# Patient Record
Sex: FEMALE | Race: BLACK OR AFRICAN AMERICAN | Employment: UNEMPLOYED | URBAN - METROPOLITAN AREA
[De-identification: names, ages, dates, MRNs, and addresses within clinical notes are randomized per-mention and may not be internally consistent; named-entity substitution may affect disease eponyms.]

---

## 2022-05-16 ENCOUNTER — OFFICE VISIT (OUTPATIENT)
Dept: URGENT CARE | Facility: URGENT CARE | Age: 37
End: 2022-05-16
Payer: MEDICAID

## 2022-05-16 VITALS
WEIGHT: 183.2 LBS | TEMPERATURE: 99 F | DIASTOLIC BLOOD PRESSURE: 90 MMHG | OXYGEN SATURATION: 100 % | HEART RATE: 103 BPM | SYSTOLIC BLOOD PRESSURE: 144 MMHG

## 2022-05-16 DIAGNOSIS — H57.89 IRRITATION OF LEFT EYE: Primary | ICD-10-CM

## 2022-05-16 PROCEDURE — 99203 OFFICE O/P NEW LOW 30 MIN: CPT | Performed by: PHYSICIAN ASSISTANT

## 2022-05-16 RX ORDER — TOBRAMYCIN AND DEXAMETHASONE 3; 1 MG/ML; MG/ML
1-2 SUSPENSION/ DROPS OPHTHALMIC EVERY 4 HOURS
Qty: 4.2 ML | Refills: 0 | Status: SHIPPED | OUTPATIENT
Start: 2022-05-16 | End: 2022-05-23

## 2022-05-16 ASSESSMENT — ENCOUNTER SYMPTOMS
PHOTOPHOBIA: 1
EYE REDNESS: 1
EYE DISCHARGE: 1
EYE PAIN: 1
EYE ITCHING: 1
FEVER: 0

## 2022-05-16 NOTE — LETTER
[facility] URGENT CARE [address] [address]  Phone: [phone]    May 16, 2022        Herberdee ANDRADE Puja  [address] [facility] [address]          To whom it may concern:    RE: Homero Luo    Patient was seen and treated today at our clinic and missed work.  Patient may return to work on 5/17/22.      Please contact me for questions or concerns.      Sincerely,        Kerline Pradhan

## 2022-05-16 NOTE — PROGRESS NOTES
SUBJECTIVE:   Homero Luo is a 36 year old female presenting with a chief complaint of   Chief Complaint   Patient presents with     Eye Infection Left Eye     Possible eye infection left eye started 1-2 days ago, pain, swollen, red, itchy, burn, very sensitive to light, pt can barely see, worsening pt said it cleared up yesterday but when she woke up it was swollen and sensitive to light       She is a new patient of [facility].  Patient states recently had glued on eye lashes and following had symptoms mentioned above.  Clear and yellow discharge.  Itches, burns and hurts.  Contacts.  Patient rubbing eyes.   She states sun hurts her eyes.      Treatment:  otc moistener gtts.  Aloe vera gel.        Review of Systems   Constitutional: Negative for fever.   Eyes: Positive for photophobia, pain, discharge, redness and itching. Negative for visual disturbance.   All other systems reviewed and are negative.      History reviewed. No pertinent past medical history.  History reviewed. No pertinent family history.  Current Outpatient Medications   Medication Sig Dispense Refill     tobramycin-dexamethasone (TOBRADEX) 0.3-0.1 % ophthalmic suspension Place 1-2 drops Into the left eye every 4 hours for 7 days 4.2 mL 0     Social History     Tobacco Use     Smoking status: Not on file     Smokeless tobacco: Not on file   Substance Use Topics     Alcohol use: Not on file       OBJECTIVE  BP (!) 144/90   Pulse 103   Temp 99  F (37.2  C) (Axillary)   Wt 83.1 kg (183 lb 3.2 oz)   SpO2 100%     Physical Exam  Vitals and nursing note reviewed.   Constitutional:       General: She is not in acute distress.     Appearance: Normal appearance. She is normal weight.   Eyes:      Extraocular Movements: Extraocular movements intact.      Conjunctiva/sclera: Conjunctivae normal.      Pupils: Pupils are equal, round, and reactive to light.      Comments: Patient had immediate relief of pain with numbing drops.  No FB noted.  Patient  has no eye lashes - states she removed all with the irritation of her eye.    No fluorescein uptake at all.  Mild edema to upper and lower lids.     Cardiovascular:      Rate and Rhythm: Normal rate.   Skin:     General: Skin is warm and dry.   Neurological:      Mental Status: She is alert.   Psychiatric:         Mood and Affect: Mood normal.         Labs:  No results found for this or any previous visit (from the past 24 hour(s)).    X-Ray was not done.    ASSESSMENT:      ICD-10-CM    1. Irritation of left eye  H57.89 tobramycin-dexamethasone (TOBRADEX) 0.3-0.1 % ophthalmic suspension     Adult Eye Referral        Medical Decision Making:    Differential Diagnosis:  Eye Problem: Bacterial conjunctivitis  Viral conjunctivitis  Allergic conjunctivitis  Stye (external)  Stye (internal)  Corneal abrasion  Foreign body    Serious Comorbid Conditions:  Adult:  reviewed    PLAN:      rx for tobradex.  If no improvement in 2 days, ophthalmology referral.  Note for work.  Patient left with eyes open and clearly no photophobia.      Followup:    If not improving or if condition worsens, follow up with your Primary Care Provider, If not improving or if conditions worsens over the next 12-24 hours, go to the Emergency Department    There are no Patient Instructions on file for this visit.

## 2022-07-07 ENCOUNTER — OFFICE VISIT (OUTPATIENT)
Dept: OPTOMETRY | Facility: CLINIC | Age: 37
End: 2022-07-07
Payer: MEDICAID

## 2022-07-07 DIAGNOSIS — H52.223 REGULAR ASTIGMATISM OF BOTH EYES: ICD-10-CM

## 2022-07-07 DIAGNOSIS — H52.13 MYOPIA OF BOTH EYES: Primary | ICD-10-CM

## 2022-07-07 PROCEDURE — 92310 CONTACT LENS FITTING OU: CPT | Mod: GA | Performed by: OPTOMETRIST

## 2022-07-07 PROCEDURE — 92015 DETERMINE REFRACTIVE STATE: CPT | Performed by: OPTOMETRIST

## 2022-07-07 PROCEDURE — 92004 COMPRE OPH EXAM NEW PT 1/>: CPT | Performed by: OPTOMETRIST

## 2022-07-07 ASSESSMENT — VISUAL ACUITY
OS_PH_SC: 20/60
VA_OR_OS_CURRENT_RX: 20/20
METHOD: SNELLEN - LINEAR
OS_SC: 20/400
VA_OR_OD_CURRENT_RX: 20/20
OS_SC: 20/60
CORRECTION_TYPE: CONTACTS
OD_CC: 20/20
OD_CC: 20/20
OS_PH_SC+: -1
OD_CC+: -1

## 2022-07-07 ASSESSMENT — REFRACTION_MANIFEST
OS_CYLINDER: +1.00
OS_SPHERE: -4.75
OD_CYLINDER: +1.25
METHOD_AUTOREFRACTION: 1
OD_SPHERE: -4.00
OS_AXIS: 075
OD_AXIS: 107

## 2022-07-07 ASSESSMENT — REFRACTION_CURRENTRX
OD_SPHERE: -3.25
OS_BRAND: ALCON AIR OPTIX PLUS HYDRAGLYDE BC 8.6, D 14.2
OD_BRAND: ALCON AIR OPTIX PLUS HYDRAGLYDE BC 8.6, D 14.2
OS_SPHERE: -4.00

## 2022-07-07 ASSESSMENT — CUP TO DISC RATIO
OS_RATIO: 0.2
OD_RATIO: 0.2

## 2022-07-07 ASSESSMENT — CONF VISUAL FIELD
OS_NORMAL: 1
OD_NORMAL: 1

## 2022-07-07 ASSESSMENT — TONOMETRY
OS_IOP_MMHG: 17
OD_IOP_MMHG: 16
IOP_METHOD: APPLANATION

## 2022-07-07 ASSESSMENT — EXTERNAL EXAM - RIGHT EYE: OD_EXAM: NORMAL

## 2022-07-07 ASSESSMENT — SLIT LAMP EXAM - LIDS
COMMENTS: NORMAL
COMMENTS: NORMAL

## 2022-07-07 ASSESSMENT — EXTERNAL EXAM - LEFT EYE: OS_EXAM: NORMAL

## 2022-07-07 NOTE — PROGRESS NOTES
Chief Complaint   Patient presents with     Annual Eye Exam     Accompanied by kids  Previous contact lens wearer? Yes: Alma Strickland  Comfort of contact lenses :good  Satisfied with current lenses: Yes        Last Eye Exam: 2 years ago   Dilated Previously: Yes, side effects of dilation explained today    What are you currently using to see?  glasses and contacts    Distance Vision Acuity: Noticed gradual change in both eyes    Near Vision Acuity: Not satisfied     Eye Comfort: teary  Do you use eye drops? : Yes: AT  Occupation or Hobbies:        Marya Lee - Optometric Assistant     Medical, surgical and family histories reviewed and updated 7/7/2022.       OBJECTIVE: See Ophthalmology exam    ASSESSMENT:    ICD-10-CM    1. Myopia of both eyes - Both Eyes  H52.13 EYE EXAM (SIMPLE-NONBILLABLE)     REFRACTION     CONTACT LENS FITTING,BILAT w/ signed waiver   2. Regular astigmatism of both eyes - Both Eyes  H52.223       PLAN:     Patient Instructions   Myopia is a result of long eyes. It is commonly referred to as near-sightedness. Seeing clearly in the distance is the main challenge.    Eyeglass prescription given.    The affects of the dilating drops last for 4- 6 hours.  You will be more sensitive to light and vision will be blurry up close.  Do not drive if you do not feel comfortable.  Mydriatic sunglasses were given if needed.    Recommend annual eye exams.    Landy Dupont O.D.  [address] [address]    [phone]

## 2022-07-07 NOTE — LETTER
7/7/2022         RE: Homero Landry  [address] [facility] [address]        Dear Colleague,    Thank you for referring your patient, Homero Landry, to the [facility]. Please see a copy of my visit note below.    Chief Complaint   Patient presents with     Annual Eye Exam     Accompanied by kids  Previous contact lens wearer? Yes: Acuvtiff Strickland  Comfort of contact lenses :good  Satisfied with current lenses: Yes        Last Eye Exam: 2 years ago   Dilated Previously: Yes, side effects of dilation explained today    What are you currently using to see?  glasses and contacts    Distance Vision Acuity: Noticed gradual change in both eyes    Near Vision Acuity: Not satisfied     Eye Comfort: teary  Do you use eye drops? : Yes: AT  Occupation or Hobbies:        Marya Lee - Optometric Assistant     Medical, surgical and family histories reviewed and updated 7/7/2022.       OBJECTIVE: See Ophthalmology exam    ASSESSMENT:    ICD-10-CM    1. Myopia of both eyes - Both Eyes  H52.13 EYE EXAM (SIMPLE-NONBILLABLE)     REFRACTION     CONTACT LENS FITTING,BILAT w/ signed waiver   2. Regular astigmatism of both eyes - Both Eyes  H52.223       PLAN:     Patient Instructions   Myopia is a result of long eyes. It is commonly referred to as near-sightedness. Seeing clearly in the distance is the main challenge.    Eyeglass prescription given.    The affects of the dilating drops last for 4- 6 hours.  You will be more sensitive to light and vision will be blurry up close.  Do not drive if you do not feel comfortable.  Mydriatic sunglasses were given if needed.    Recommend annual eye exams.    Landy Dupont O.D.  [facility]   [address] [place] [address]    [phone]                             Again, thank you for allowing me to participate in the care of your patient.        Sincerely,        Landy Dupont OD

## 2022-07-07 NOTE — PATIENT INSTRUCTIONS
Myopia is a result of long eyes. It is commonly referred to as near-sightedness. Seeing clearly in the distance is the main challenge.    Eyeglass prescription given.    The affects of the dilating drops last for 4- 6 hours.  You will be more sensitive to light and vision will be blurry up close.  Do not drive if you do not feel comfortable.  Mydriatic sunglasses were given if needed.    Recommend annual eye exams.    Landy Dupont O.D.  [address] [address] [phone]

## 2022-09-05 ENCOUNTER — NURSE TRIAGE (OUTPATIENT)
Dept: NURSING | Facility: CLINIC | Age: 37
End: 2022-09-05

## 2022-09-05 NOTE — TELEPHONE ENCOUNTER
S: Eye infection    B: Had an eye infection back in May and did not complete the full course of antibiotic eyedrops. States she has the same infection again and is wondering how she can get more antibiotic eyedrops. States she has moderate discomfort when she looks at light, denies fever, denies eye discharge, denies eye injury.    A: probable eye infection    R: Advised patient that she would need to be seen to get another prescription for the eyedrops. Patient advised ok and hung up before any care advice could be given.     RICHIE STEPHENS RN      Reason for Disposition    Looking at light causes MODERATE to SEVERE eye pain (i.e., photophobia)    Additional Information    Negative: Followed an eye injury    Negative: Eye pain from chemical in the eye    Negative: Eye pain from foreign body in eye    Negative: Has sinus pain or pressure    Negative: SEVERE eye pain    Negative: Complete loss of vision in one or both eyes    Negative: Eyelids are very swollen (shut or almost) and fever    Negative: Eyelid (outer) is very red and fever    Negative: Foreign body sensation ('feels like something is in there') and irrigation didn't help    Negative: Blurred vision and new or worsening    Negative: Recent eye surgery and increasing eye pain    Negative: Ulcer or sore seen on the cornea (clear center part of the eye)    Negative: Vomiting    Negative: Patient sounds very sick or weak to the triager    Negative: MODERATE eye pain or discomfort (e.g., interferes with normal activities or awakens from sleep; more than mild)    Protocols used: EYE PAIN AND OTHER SYMPTOMS-A-OH

## 2022-09-06 ENCOUNTER — OFFICE VISIT (OUTPATIENT)
Dept: URGENT CARE | Facility: URGENT CARE | Age: 37
End: 2022-09-06
Payer: COMMERCIAL

## 2022-09-06 VITALS
SYSTOLIC BLOOD PRESSURE: 161 MMHG | WEIGHT: 184.6 LBS | DIASTOLIC BLOOD PRESSURE: 102 MMHG | TEMPERATURE: 98.3 F | BODY MASS INDEX: 25.84 KG/M2 | HEIGHT: 71 IN | HEART RATE: 108 BPM | OXYGEN SATURATION: 100 %

## 2022-09-06 DIAGNOSIS — H57.11 ACUTE RIGHT EYE PAIN: Primary | ICD-10-CM

## 2022-09-06 DIAGNOSIS — H57.89 REDNESS OF RIGHT EYE: ICD-10-CM

## 2022-09-06 DIAGNOSIS — I16.0 HYPERTENSIVE URGENCY: ICD-10-CM

## 2022-09-06 PROCEDURE — 99214 OFFICE O/P EST MOD 30 MIN: CPT | Performed by: PHYSICIAN ASSISTANT

## 2022-09-06 RX ORDER — OFLOXACIN 3 MG/ML
1-2 SOLUTION/ DROPS OPHTHALMIC 4 TIMES DAILY
Qty: 4 ML | Refills: 0 | Status: SHIPPED | OUTPATIENT
Start: 2022-09-06 | End: 2022-09-14

## 2022-09-06 NOTE — PROGRESS NOTES
Chief Complaint   Patient presents with     Eye Pain Right Eye           ASSESSMENT:     ICD-10-CM    1. Acute right eye pain  H57.11 ofloxacin (OCUFLOX) 0.3 % ophthalmic solution     Adult Eye  Referral   2. Redness of right eye  H57.89    3. Hypertensive urgency  I16.0            PLAN: Painful red right eye.  No obvious uptake with fluorescein dye but eye is constantly watering and washes away the dye.  Unable to do appropriate funduscopic exam due to eye watering and photophobia.  Patient wants TobraDex which she had in May.  Told her I do not feel comfortable prescribing eyedrop with steroid in it when I do know the cause of the eye pain and redness.  In addition we do not have slit-lamp examination machine to rule out more serious things.  She asked to see a different provider.  coworker, Alfonso Hurtado PA-C,  Also talked to the patient.  We do not understand why she is insistent on that same prescription.  I am more than willing to prescribe a different antibiotic drop but nothing with steroid in it.  Steroid eyedrop should be prescribed by optometrist or ophthalmologist, not family practice.  In addition with the elevated blood pressure I cannot rule out some sort of stroke in her eye.  Recommend the ER.  If she is not willing to go to the ER she should at least follow-up with optometry within the next couple of days.  Patient left frustrated.  I did prescribe ofloxacin  Antibiotic ointment.    I have discussed the possibility of  worsening symptoms and indication to RTC or go to the ER if they occur.  All questions are answered, patient indicates understanding of these issues and is in agreement with plan.   Patient care instructions are discussed/given at the end of visit.   Lots of rest and fluids.      Lora Sauceda PA-C      SUBJECTIVE:  36-year-old female presents for right eye pain since yesterday.  Headache behind right eye.  Thinks her niece may have hit her eye with her finger last  "night.  She states the eye was irritated prior to this but became worse after it.  Bright lights bother it.  Had eye doctor examination in July 2020 with normal eye pressures.  Does wear contacts.  Was seen in May in urgent care for left eye irritation and was given TobraDex antibiotic/steroid eyedrop.  She states she only took 2 days of it and did not finish it and feels this is the reason she still has the eye irritation.  No exudate.  Contacted nurse triage line and was told if she wanted a prescription for this she would need to be seen.  Denies decreased vision.    No Known Allergies    No past medical history on file.    No current outpatient medications on file prior to visit.  No current facility-administered medications on file prior to visit.      Social History     Tobacco Use     Smoking status: Current Every Day Smoker     Smokeless tobacco: Never Used   Substance Use Topics     Alcohol use: Not on file       ROS:  CONSTITUTIONAL: Negative for fatigue or fever.  EYES: Negative for eye problems.  ENT: As above.  RESP: As above.  CV: Negative for chest pains.  GI: Negative for vomiting.  MUSCULOSKELETAL:  Negative for significant muscle or joint pains.  NEUROLOGIC: Negative for headaches.  SKIN: Negative for rash.  PSYCH: Normal mentation for age.    OBJECTIVE:  BP (!) 161/102 (BP Location: Left arm, Patient Position: Sitting, Cuff Size: Adult Regular)   Pulse 108   Temp 98.3  F (36.8  C) (Tympanic)   Ht 1.803 m (5' 11\")   Wt 83.7 kg (184 lb 9.6 oz)   SpO2 100%   BMI 25.75 kg/m    GENERAL APPEARANCE: Sitting in exam room with light off    EYES:Conjunctiva/sclera clear with moderate injection on the right.  Pupils equal reactive to light and accommodation.  Photophobia right eye.  Eye watering.  Unable to do funduscopic exam due to the eye irritation.  Proparacaine and fluorescein dye placed.  No uptake.  No obvious foreign body noted.    Lora Sauceda PA-C    "

## 2022-09-07 ENCOUNTER — OFFICE VISIT (OUTPATIENT)
Dept: OPTOMETRY | Facility: CLINIC | Age: 37
End: 2022-09-07
Attending: PHYSICIAN ASSISTANT
Payer: COMMERCIAL

## 2022-09-07 DIAGNOSIS — H57.11 ACUTE RIGHT EYE PAIN: ICD-10-CM

## 2022-09-07 DIAGNOSIS — H16.9 KERATITIS: Primary | ICD-10-CM

## 2022-09-07 PROCEDURE — 99213 OFFICE O/P EST LOW 20 MIN: CPT | Performed by: OPTOMETRIST

## 2022-09-07 RX ORDER — NEOMYCIN SULFATE, POLYMYXIN B SULFATE AND DEXAMETHASONE 3.5; 10000; 1 MG/ML; [USP'U]/ML; MG/ML
1-2 SUSPENSION/ DROPS OPHTHALMIC EVERY 4 HOURS
Qty: 5 ML | Refills: 1 | Status: SHIPPED | OUTPATIENT
Start: 2022-09-07 | End: 2024-07-25

## 2022-09-07 ASSESSMENT — VISUAL ACUITY
CORRECTION_TYPE: GLASSES
OD_CC: 20/25
METHOD: SNELLEN - LINEAR
OS_CC: 20/25
OD_CC+: -1

## 2022-09-07 ASSESSMENT — TONOMETRY
OS_IOP_MMHG: 14
IOP_METHOD: APPLANATION
OD_IOP_MMHG: 14

## 2022-09-07 ASSESSMENT — SLIT LAMP EXAM - LIDS
COMMENTS: NORMAL
COMMENTS: NORMAL

## 2022-09-07 ASSESSMENT — EXTERNAL EXAM - LEFT EYE: OS_EXAM: NORMAL

## 2022-09-07 ASSESSMENT — CONF VISUAL FIELD
OS_NORMAL: 1
OD_NORMAL: 1

## 2022-09-07 ASSESSMENT — EXTERNAL EXAM - RIGHT EYE: OD_EXAM: NORMAL

## 2022-09-07 NOTE — LETTER
9/7/2022         RE: Homero Landry  [address] [facility] [place] [address]        Dear Colleague,    Thank you for referring your patient, Homero Landry, to the [facility]. Please see a copy of my visit note below.    Chief Complaint   Patient presents with     Eye Pain     Right eye, playing with niece Monday, photophobia, sharp pain like a needle is poking, pain with eye movement, foreign body sensation. Blood pressure was very high yesterday on 9/6/22 and was told to be checked to make sure she wasn't having an eye stroke.                Marya Lee - Optometric Assistant     See Review Of Systems       Medical, surgical and family histories reviewed and updated 9/7/2022.         OBJECTIVE: See Ophthalmology exam    ASSESSMENT:    ICD-10-CM    1. Keratitis  H16.9 neomycin-polymyxin-dexamethasone (MAXITROL) 3.5-37508-4.1 SUSP ophthalmic susp   2. Acute right eye pain  H57.11 Adult Eye  Referral     neomycin-polymyxin-dexamethasone (MAXITROL) 3.5-45499-1.1 SUSP ophthalmic susp      PLAN:    Patient Instructions   MAXITROL IS TO BE INSTILLED IN THE RIGHT EYE EVERY 4 HOURS WHILE AWAKE  TO TREAT AND MANAGE KERATITIS IN THE RIGHT EYE.    NO CONTACT LENS WEAR IS ADVISED.      RTC 1-2 WEEKS FOR A FOLLOWUP VISIT SO THE EYE PRESSURE CAN BE MONITORED.    KERATITIS IS NOT CONTAGIOUS          The affects of the dilating drops last for 4- 6 hours.  You will be more sensitive to light and vision will be blurry up close.  Do not drive if you do not feel comfortable.  Mydriatic sunglasses were given if needed.    PT WAS AT CLINIC UNTIL NOON ON SEPT 7, 2022.      Landy Dupont O.D.  [facility]   [address] [place] [address]    [phone]           Again, thank you for allowing me to participate in the care of your patient.        Sincerely,        Landy Dupont OD

## 2022-09-07 NOTE — PROGRESS NOTES
Chief Complaint   Patient presents with     Eye Pain     Right eye, playing with niece Monday, photophobia, sharp pain like a needle is poking, pain with eye movement, foreign body sensation. Blood pressure was very high yesterday on 9/6/22 and was told to be checked to make sure she wasn't having an eye stroke.                Marya Lee - Optometric Assistant     See Review Of Systems       Medical, surgical and family histories reviewed and updated 9/7/2022.         OBJECTIVE: See Ophthalmology exam    ASSESSMENT:    ICD-10-CM    1. Keratitis  H16.9 neomycin-polymyxin-dexamethasone (MAXITROL) 3.5-61317-0.1 SUSP ophthalmic susp   2. Acute right eye pain  H57.11 Adult Eye  Referral     neomycin-polymyxin-dexamethasone (MAXITROL) 3.5-12073-5.1 SUSP ophthalmic susp      PLAN:    Patient Instructions   MAXITROL IS TO BE INSTILLED IN THE RIGHT EYE EVERY 4 HOURS WHILE AWAKE  TO TREAT AND MANAGE KERATITIS IN THE RIGHT EYE.    NO CONTACT LENS WEAR IS ADVISED.      RTC 1-2 WEEKS FOR A FOLLOWUP VISIT SO THE EYE PRESSURE CAN BE MONITORED.    KERATITIS IS NOT CONTAGIOUS          The affects of the dilating drops last for 4- 6 hours.  You will be more sensitive to light and vision will be blurry up close.  Do not drive if you do not feel comfortable.  Mydriatic sunglasses were given if needed.    PT WAS AT CLINIC UNTIL NOON ON SEPT 7, 2022.      Landy Dupont O.D.  [facility]   [address] [place] [address]    [phone]

## 2022-09-07 NOTE — PATIENT INSTRUCTIONS
MAXITROL IS TO BE INSTILLED IN THE RIGHT EYE EVERY 4 HOURS WHILE AWAKE  TO TREAT AND MANAGE KERATITIS IN THE RIGHT EYE.    NO CONTACT LENS WEAR IS ADVISED.      RTC 1-2 WEEKS FOR A FOLLOWUP VISIT SO THE EYE PRESSURE CAN BE MONITORED.    KERATITIS IS NOT CONTAGIOUS          The affects of the dilating drops last for 4- 6 hours.  You will be more sensitive to light and vision will be blurry up close.  Do not drive if you do not feel comfortable.  Mydriatic sunglasses were given if needed.    PT WAS AT CLINIC UNTIL NOON ON SEPT 7, 2022.      Landy Dupont O.D.  [address] [address]    [phone]

## 2023-08-07 ENCOUNTER — TELEPHONE (OUTPATIENT)
Dept: OPTOMETRY | Facility: CLINIC | Age: 38
End: 2023-08-07
Payer: COMMERCIAL

## 2023-08-07 NOTE — TELEPHONE ENCOUNTER
[facility] Call Center    Phone Message    May a detailed message be left on voicemail: yes     Reason for Call: Other: Patient called requesting a call back from clinical staff. She states she has some questions. She would not go into detail. Please call to advise.      Action Taken: Other: eye    Travel Screening: Not Applicable

## 2023-08-08 ENCOUNTER — OFFICE VISIT (OUTPATIENT)
Dept: FAMILY MEDICINE | Facility: CLINIC | Age: 38
End: 2023-08-08
Payer: COMMERCIAL

## 2023-08-08 VITALS
DIASTOLIC BLOOD PRESSURE: 117 MMHG | BODY MASS INDEX: 25.06 KG/M2 | SYSTOLIC BLOOD PRESSURE: 155 MMHG | TEMPERATURE: 98.3 F | HEIGHT: 71 IN | WEIGHT: 179 LBS | OXYGEN SATURATION: 100 % | HEART RATE: 77 BPM | RESPIRATION RATE: 16 BRPM

## 2023-08-08 DIAGNOSIS — Z11.59 NEED FOR HEPATITIS C SCREENING TEST: ICD-10-CM

## 2023-08-08 DIAGNOSIS — R03.0 ELEVATED BP WITHOUT DIAGNOSIS OF HYPERTENSION: ICD-10-CM

## 2023-08-08 DIAGNOSIS — Z28.21 IMMUNIZATION DECLINED: ICD-10-CM

## 2023-08-08 DIAGNOSIS — D64.9 ANEMIA, UNSPECIFIED TYPE: Primary | ICD-10-CM

## 2023-08-08 DIAGNOSIS — Z11.4 SCREENING FOR HIV (HUMAN IMMUNODEFICIENCY VIRUS): ICD-10-CM

## 2023-08-08 DIAGNOSIS — Z12.4 CERVICAL CANCER SCREENING: ICD-10-CM

## 2023-08-08 LAB
ERYTHROCYTE [DISTWIDTH] IN BLOOD BY AUTOMATED COUNT: 27.7 % (ref 10–15)
HCT VFR BLD AUTO: 32.1 % (ref 35–47)
HGB BLD-MCNC: 8.7 G/DL (ref 11.7–15.7)
MCH RBC QN AUTO: 18 PG (ref 26.5–33)
MCHC RBC AUTO-ENTMCNC: 27.1 G/DL (ref 31.5–36.5)
MCV RBC AUTO: 66 FL (ref 78–100)
PLATELET # BLD AUTO: 296 10E3/UL (ref 150–450)
RBC # BLD AUTO: 4.84 10E6/UL (ref 3.8–5.2)
WBC # BLD AUTO: 6.2 10E3/UL (ref 4–11)

## 2023-08-08 PROCEDURE — 86803 HEPATITIS C AB TEST: CPT | Performed by: FAMILY MEDICINE

## 2023-08-08 PROCEDURE — 36415 COLL VENOUS BLD VENIPUNCTURE: CPT | Performed by: FAMILY MEDICINE

## 2023-08-08 PROCEDURE — 85027 COMPLETE CBC AUTOMATED: CPT | Performed by: FAMILY MEDICINE

## 2023-08-08 PROCEDURE — 99213 OFFICE O/P EST LOW 20 MIN: CPT | Performed by: FAMILY MEDICINE

## 2023-08-08 PROCEDURE — 87389 HIV-1 AG W/HIV-1&-2 AB AG IA: CPT | Performed by: FAMILY MEDICINE

## 2023-08-08 PROCEDURE — 82728 ASSAY OF FERRITIN: CPT | Performed by: FAMILY MEDICINE

## 2023-08-08 ASSESSMENT — PATIENT HEALTH QUESTIONNAIRE - PHQ9
10. IF YOU CHECKED OFF ANY PROBLEMS, HOW DIFFICULT HAVE THESE PROBLEMS MADE IT FOR YOU TO DO YOUR WORK, TAKE CARE OF THINGS AT HOME, OR GET ALONG WITH OTHER PEOPLE: VERY DIFFICULT
SUM OF ALL RESPONSES TO PHQ QUESTIONS 1-9: 12
SUM OF ALL RESPONSES TO PHQ QUESTIONS 1-9: 12

## 2023-08-08 ASSESSMENT — PAIN SCALES - GENERAL: PAINLEVEL: NO PAIN (0)

## 2023-08-08 NOTE — TELEPHONE ENCOUNTER
Called patient. She mentioned she didn't need anything to do with eyes and that she just left her doctor.

## 2023-08-08 NOTE — PROGRESS NOTES
Assessment & Plan     Anemia, unspecified type  -Homero was seen by me for the first time.  -She presented to the clinic wanting hemoglobin checked.  -She went to ED 10 days back for abdominal pain.  Hemoglobin was found to be low at 5.7.  CT scan was unremarkable.  Her abdominal pain has improved but she wants to recheck her hemoglobin.  Received 2 units of blood transfusion in ED. This was the first time she got blood transfusion.  -She has difficulty tolerating iron supplements due to nausea and vomiting, she does have few pills at home.  -Previously was Jenovah witness, Homero has not had blood transfusions in the past.  Homero reported that she has had hemoglobin levels between 5-6 in the past which improved without any intervention.  No history of iron transfusions.    Elevated BP without diagnosis of hypertension  -Blood pressure is elevated at 155/117.  -Currently not on any medications.  -Homero reported that her home blood pressures are normal but she couldn't remember the range.  And added that she does not prefer being on any medication.     Cervical cancer screening  -Homero reported that she had Pap in 2020 at [place] which was normal.  -Recommended to get the results and let us know to update in the chart.    Screening for HIV (human immunodeficiency virus)  -Routine screening.  - HIV Antigen Antibody Combo; Future    Need for hepatitis C screening test  -Routine screening.  - Hepatitis C Screen Reflex to HCV RNA Quant and Genotype; Future    Immunization declined  -Homero declined all immunizations.          Nicotine/Tobacco Cessation:  She reports that she has been smoking. She has never used smokeless tobacco.        Depression Screening Follow Up        8/8/2023    10:50 AM   PHQ   PHQ-9 Total Score 12   Q9: Thoughts of better off dead/self-harm past 2 weeks Not at all         Follow Up Actions Taken           Hemant Alejo MD  [facility]  "JOSHUA Valentin is a 37 year old, presenting for the following health issues:  No chief complaint on file.        8/8/2023    10:58 AM   Additional Questions   Roomed by Paco   Accompanied by Self       History of Present Illness       Reason for visit:  Followup for bloof transfusion    She eats 0-1 servings of fruits and vegetables daily.She consumes 2 sweetened beverage(s) daily.She exercises with enough effort to increase her heart rate 60 or more minutes per day.         ED/ Followup:    Facility:  [facility]  Date of visit: 7/29/23  Reason for visit: Abd pain & Anemia  Current Status: feels better no abd pain      ?done at [facility] in 2020  Review of Systems   Constitutional, HEENT, cardiovascular, pulmonary, gi and gu systems are negative, except as otherwise noted.      Objective    BP (!) 155/117   Pulse 77   Temp 98.3  F (36.8  C) (Oral)   Resp 16   Ht 1.803 m (5' 11\")   Wt 81.2 kg (179 lb)   LMP 07/31/2023   SpO2 100%   BMI 24.97 kg/m    Body mass index is 24.97 kg/m .  Physical Exam   GENERAL: healthy, alert and no distress  NECK: no adenopathy, no asymmetry, masses, or scars and thyroid normal to palpation  RESP: lungs clear to auscultation - no rales, rhonchi or wheezes  CV: regular rate and rhythm, normal S1 S2, no S3 or S4, no murmur, click or rub, no peripheral edema and peripheral pulses strong  ABDOMEN: soft, nontender, no hepatosplenomegaly, no masses and bowel sounds normal  MS: no gross musculoskeletal defects noted, no edema                      "

## 2023-08-09 DIAGNOSIS — D50.9 IRON DEFICIENCY ANEMIA, UNSPECIFIED IRON DEFICIENCY ANEMIA TYPE: Primary | ICD-10-CM

## 2023-08-09 LAB
FERRITIN SERPL-MCNC: 7 NG/ML (ref 6–175)
HCV AB SERPL QL IA: NONREACTIVE
HIV 1+2 AB+HIV1 P24 AG SERPL QL IA: NONREACTIVE

## 2023-08-13 ENCOUNTER — HEALTH MAINTENANCE LETTER (OUTPATIENT)
Age: 38
End: 2023-08-13

## 2023-10-24 ENCOUNTER — TELEPHONE (OUTPATIENT)
Dept: FAMILY MEDICINE | Facility: CLINIC | Age: 38
End: 2023-10-24
Payer: COMMERCIAL

## 2023-10-24 NOTE — COMMUNITY RESOURCES LIST (ENGLISH)
10/24/2023   [facility]  N/A  For questions about this resource list or additional care needs, please contact your primary care clinic or care manager.  Phone: [phone]   Email: N/A   Address: [facility] [address] [place] [address]   Hours: N/A        Financial Stability       Rent and mortgage payment assistance  1  [facility] Distance: 4.33 miles      In-Person, Phone/Virtual   [address] [place] [address]  Language: English  Hours: Mon - Fri 8:00 AM - 4:30 PM  Fees: Free   Phone: (384) 354-4879 Email: servicecenterinfo@[facility] Website: https://www.SunsetSunible/[facility]/facilities/service-center-info     2  [facility] ([facility] Distance: 4.38 miles      In-Person   [address] [place] [address]  Language: English  Hours: Mon - Fri 8:00 AM - 4:00 PM  Fees: Free   Phone: (855) 739-1155 Email: info@[facility].[employer] Website: https://[facility]EastOn The Net Yet/          Food and Nutrition       Food pantry  3  [facility] - Food pantry Distance: 0.81 miles      Pickup   [address] [place] [address]  Language: English  Hours: Fri 9:30 AM - 2:00 PM  Fees: Free   Phone: (862) 660-8676 Email: contact@[employer]apcoder.org Website: https://Zapcoder.org/     4  [facility] Food Shelf Distance: 1.31 miles      Pickup   [address] [place] [address]  Language: English  Hours: Thu 2:00 PM - 6:00 PM , Thu 3:00 PM - 6:00 PM  Fees: Free   Phone: (793) 558-6388 Email: foodshelf@[facility].[employer] Website: https://Dinner Lab.org/serve/food-shelf/     SNAP application assistance  5  [employer] [facility] [employer] [place] ([employer]) Distance: 3.67 miles      In-Person, Phone/Virtual   [address] [place] [address]  Language: English, Hmong  Hours: Mon - Fri  8:30 AM - 4:30 PM  Fees: Free   Phone: (433) 141-6970 Ext.1 Email: info@[facility].org Website: https://www.Context app/     6  [facility] Distance: 4.33 miles      Phone/Virtual   [address] [place] [address]  Language: English, German  Hours: Mon 9:00 AM - 1:00 PM , Tue - Thu 9:00 AM - 4:00 PM , Fri 9:00 AM - 1:00 PM  Fees: Free   Phone: (190) 269-8105 Email: info@[employer] Website: http://www.mana.bo.org/     Soup kitchen or free meals  7  [facility] [place] [place] [place] Distance: 1.02 miles      [facility]   [address] [place] [address]  Language: English  Hours: Wed 5:30 PM - 6:30 PM  Fees: Free   Phone: (558) 229-8106 Email: rafat@[employer] Website: https://www.Truzip/     8  [facility] - [place] [place] Distance: 2.98 miles      In-Person   [address] [place] [address]  Language: English  Hours: Mon - Fri 12:30 PM - 1:30 PM  Fees: Free   Phone: (603) 387-8949 Email: manju@Mytrus Website: https://Mytrus/          Important Numbers & Websites       Emergency Services   911  City Services   311  Poison Control   (989) 637-2827  Suicide Prevention Lifeline   (415) 739-9557 (TALK)  Child Abuse Hotline   (744) 324-7685 (4-A-Child)  Sexual Assault Hotline   (696) 381-3704 (HOPE)  National Runaway Safeline   (949) 675-6610 (RUNAWAY)  All-Options Talkline   (903) 260-6163  Substance Abuse Referral   (535) 834-8943 (HELP)

## 2023-10-24 NOTE — TELEPHONE ENCOUNTER
Pt calling to request a prescription for prenatals and nausea medication.    Assisted pt in scheduling an appointment to get prescription.      Arti San RN  [facility]

## 2023-10-25 ENCOUNTER — VIRTUAL VISIT (OUTPATIENT)
Dept: FAMILY MEDICINE | Facility: CLINIC | Age: 38
End: 2023-10-25
Payer: COMMERCIAL

## 2023-10-25 DIAGNOSIS — Z32.01 PREGNANCY TEST POSITIVE: Primary | ICD-10-CM

## 2023-10-25 PROCEDURE — 99442 PR PHYSICIAN TELEPHONE EVALUATION 11-20 MIN: CPT | Mod: 93 | Performed by: PHYSICIAN ASSISTANT

## 2023-10-25 RX ORDER — ONDANSETRON 4 MG/1
4 TABLET, ORALLY DISINTEGRATING ORAL EVERY 8 HOURS PRN
Qty: 21 TABLET | Refills: 0 | Status: SHIPPED | OUTPATIENT
Start: 2023-10-25 | End: 2024-07-25

## 2023-10-25 RX ORDER — PRENATAL VIT/IRON FUM/FOLIC AC 27MG-0.8MG
1 TABLET ORAL DAILY
Qty: 90 TABLET | Refills: 3 | Status: SHIPPED | OUTPATIENT
Start: 2023-10-25 | End: 2024-07-25

## 2023-10-25 ASSESSMENT — ENCOUNTER SYMPTOMS: NAUSEA: 1

## 2023-10-25 NOTE — PROGRESS NOTES
Homero is a 38 year old who is being evaluated via a billable telephone visit.      What phone number would you like to be contacted at? [phone]  How would you like to obtain your AVS? Mail a copy    Distant Location (provider location):  On-site    Assessment & Plan   Problem List Items Addressed This Visit    None  Visit Diagnoses       Pregnancy test positive    -  Primary    Relevant Medications    Prenatal Vit-Fe Fumarate-FA (PRENATAL MULTIVITAMIN W/IRON) 27-0.8 MG tablet    ondansetron (ZOFRAN ODT) 4 MG ODT tab    Other Relevant Orders    Ob/Gyn  Referral    US OB < 14 Weeks Single         Pt is about 6-8 weeks along  Prenatal 1 tab daily  Zofran 4 mg every 8-12 hours as needed     Schedule appointment with OBGYN as soon as possible   Schedule US, if unable to get records from [facility]    16 minutes spent by me on the date of the encounter doing chart review, history and exam, documentation and further activities per the note           Perla Sanchez PA-C  [facility]    Fabiana Valentin is a 38 year old, presenting for the following health issues:  Nausea        10/25/2023     6:55 AM   Additional Questions   Roomed by Tapan       History of Present Illness       Reason for visit:  Prenantal pills and nausea meds  Symptom onset:  1-2 weeks ago  Symptoms include:  Nausea, and previous abdominal pain  Symptom intensity:  Mild  Symptom progression:  Staying the same  Had these symptoms before:  No  What makes it worse:  None  What makes it better:  Sleeping    She eats 2-3 servings of fruits and vegetables daily.She consumes 2 sweetened beverage(s) daily.She exercises with enough effort to increase her heart rate 20 to 29 minutes per day.  She exercises with enough effort to increase her heart rate 4 days per week.   She is taking medications regularly.     LMP about 2 month ago. Pt was seen at [facility] clinic in [place], she had confirmatory  pregnancy test and US. She does not have US results and does not remember information about gestational age or when she is due.     Pt is requesting prenatal vitamins and nausea medication prescriptions today        Review of Systems   Gastrointestinal:  Positive for nausea.      Constitutional, HEENT, cardiovascular, pulmonary, gi and gu systems are negative, except as otherwise noted.      Objective           Vitals:  No vitals were obtained today due to virtual visit.    Physical Exam   healthy, alert, and no distress  PSYCH: Alert and oriented times 3; coherent speech, normal   rate and volume, able to articulate logical thoughts, able   to abstract reason, no tangential thoughts, no hallucinations   or delusions  Her affect is normal  RESP: No cough, no audible wheezing, able to talk in full sentences  Remainder of exam unable to be completed due to telephone visits                Phone call duration: 15 minutes

## 2023-10-26 ENCOUNTER — TELEPHONE (OUTPATIENT)
Dept: OBGYN | Facility: CLINIC | Age: 38
End: 2023-10-26
Payer: COMMERCIAL

## 2023-10-26 NOTE — TELEPHONE ENCOUNTER
Called to offer earlier appointment tomorrow in [place] with Dr. Jeffers. Appointment will not work with her schedule. She is added to waitlist and should be notified if an earlier appointment becomes available and will keep already scheduled appointment for 11/14/23.    [employer] message sent also to schedule Prenatal Nurse Intake appointment prior to this appointment.    Kelli Mishra, [facility]

## 2023-10-26 NOTE — TELEPHONE ENCOUNTER
[facility]    Phone Message    May a detailed message be left on voicemail: yes     Reason for Call: Other: Patient called to schedule appointment for OB. Patient is approx 10w along per LMP being 8/17/23. Writer could not find any sooner openings then 11/14 which she would be 13 weeks along. Please call the patient back to reschedule if needing to be seen sooner. Thank you.      Action Taken: Other: OBGYN    Travel Screening: Not Applicable

## 2023-10-30 ENCOUNTER — TELEPHONE (OUTPATIENT)
Dept: OPTOMETRY | Facility: CLINIC | Age: 38
End: 2023-10-30
Payer: COMMERCIAL

## 2023-10-30 NOTE — TELEPHONE ENCOUNTER
[facility]    Phone Message    May a detailed message be left on voicemail: yes     Reason for Call: Other: Pt is requesting a call from  regarding a lens appointment and as well as reordering her glasses. Please have  reach out to pt. Thank You!     Action Taken: Message routed to:  [facility] eye    Travel Screening: Not Applicable

## 2023-11-28 ENCOUNTER — VIRTUAL VISIT (OUTPATIENT)
Dept: FAMILY MEDICINE | Facility: CLINIC | Age: 38
End: 2023-11-28
Payer: COMMERCIAL

## 2023-11-28 DIAGNOSIS — K04.7 DENTAL INFECTION: Primary | ICD-10-CM

## 2023-11-28 PROCEDURE — 99213 OFFICE O/P EST LOW 20 MIN: CPT | Mod: VID | Performed by: FAMILY MEDICINE

## 2023-11-28 RX ORDER — AMOXICILLIN 500 MG/1
500 CAPSULE ORAL 2 TIMES DAILY
Qty: 10 CAPSULE | Refills: 0 | Status: SHIPPED | OUTPATIENT
Start: 2023-11-28 | End: 2024-07-25

## 2023-11-28 NOTE — PROGRESS NOTES
Homero is a 38 year old who is being evaluated via a billable video visit.      How would you like to obtain your AVS? MyChart  If the video visit is dropped, the invitation should be resent by: Text to cell phone: [phone]  Will anyone else be joining your video visit? No          Assessment & Plan     Dental infection  Agree with her decision to pursue dental care.  Concerned with some swelling being felt into her nasal area.  We should treat with antibiotics.  If she does not have a good improvemen, should be seen for care in emergency room.  Monitor symptoms and if worsening get help sooner.  Discussed risks associated with antibiotics.  Patient expresses understanding.      Magdiel Cazares MD  [facility]   Homero is a 38 year old, presenting for the following health issues:  Medication Request (Antibiotics for tooth infections )        11/28/2023    11:40 AM   Additional Questions   Roomed by hser   Accompanied by self       History of Present Illness       Reason for visit:  Infected tooth  Symptoms include:  Facr pain  Symptom intensity:  Severe  Symptom progression:  Worsening  Had these symptoms before:  Yes  Has tried/received treatment for these symptoms:  Yes  Previous treatment was successful:  Yes  Prior treatment description:  Antibiotics    She eats 4 or more servings of fruits and vegetables daily.She consumes 3 sweetened beverage(s) daily.She exercises with enough effort to increase her heart rate 10 to 19 minutes per day.  She exercises with enough effort to increase her heart rate 3 or less days per week.   She is taking medications regularly.     Patient with concerns of tooth pain for 4 to 5 days.  Has swelling in the gum around tooth.  Feels some pressure into her nasal cavity.  No fever.  No purulent fluid.  Try to get dental care but she cannot get an appointment with anyone that accepts her insurance until mid December.  Cannot afford care  otherwise.    She relates that she is not pregnant.  She has no allergies.          Objective    Vitals - Patient Reported  Systolic (Patient Reported):  (unable to)  Diastolic (Patient Reported):  (unable to)  Weight (Patient Reported):  (unable to)  Height (Patient Reported):  (unable to)  SpO2 (Patient Reported):  (unable to)  Temperature (Patient Reported):  (unable to)  Pulse (Patient Reported):  (unable to)  Pain Score: No Pain (0)        Physical Exam   GENERAL: Healthy, alert and no distress  EYES: Eyes grossly normal to inspection.  No discharge or erythema, or obvious scleral/conjunctival abnormalities.  RESP: No audible wheeze, cough, or visible cyanosis.  No visible retractions or increased work of breathing.    SKIN: Visible skin clear. No significant rash, abnormal pigmentation or lesions.  NEURO: Cranial nerves grossly intact.  Mentation and speech appropriate for age.  PSYCH: Mentation appears normal, affect normal/bright, judgement and insight intact, normal speech and appearance well-groomed.        Video-Visit Details    Type of service:  Video Visit   Video Start Time: 1240  Video End Time:1247    Originating Location (pt. Location): Home    Distant Location (provider location):  On-site  Platform used for Video Visit: [employer]  Prior to immunization administration, verified patients identity using patient s name and date of birth. Please see Immunization Activity for additional information.     Screening Questionnaire for Adult Immunization    Are you sick today?   No   Do you have allergies to medications, food, a vaccine component or latex?   No   Have you ever had a serious reaction after receiving a vaccination?   No   Do you have a long-term health problem with heart, lung, kidney, or metabolic disease (e.g., diabetes), asthma, a blood disorder, no spleen, complement component deficiency, a cochlear implant, or a spinal fluid leak?  Are you on long-term aspirin therapy?   No   Do you have  cancer, leukemia, HIV/AIDS, or any other immune system problem?   No   Do you have a parent, brother, or sister with an immune system problem?   No   In the past 3 months, have you taken medications that affect  your immune system, such as prednisone, other steroids, or anticancer drugs; drugs for the treatment of rheumatoid arthritis, Crohn s disease, or psoriasis; or have you had radiation treatments?   No   Have you had a seizure, or a brain or other nervous system problem?   No   During the past year, have you received a transfusion of blood or blood    products, or been given immune (gamma) globulin or antiviral drug?   No   For women: Are you pregnant or is there a chance you could become       pregnant during the next month?   No   Have you received any vaccinations in the past 4 weeks?   No     Immunization questionnaire answers were all negative.      Patient instructed to remain in clinic for 15 minutes afterwards, and to report any adverse reactions.     Screening performed by Alessio Oakley MA on 11/28/2023 at 11:43 AM.

## 2023-11-28 NOTE — COMMUNITY RESOURCES LIST (ENGLISH)
11/28/2023   [facility] [employer]  N/A  For questions about this resource list or additional care needs, please contact your primary care clinic or care manager.  Phone: [phone]   Email: N/A   Address: [address] [place] [address]   Hours: N/A        Financial Stability       Rent and mortgage payment assistance  1  [facility] Distance: 4.33 miles      In-Person, Phone/Virtual   [address] [place] [address]  Language: English  Hours: Mon - Fri 8:00 AM - 4:30 PM  Fees: Free   Phone: (696) 880-7086 Email: servicecenterinfo@[employer]. Website: https://www.[employer]linGuardian Analytics/[facility]/facilities/service-center-info     2  [facility] Distance: 4.38 miles      In-Person   [address] [place] [address]  Language: English  Hours: Mon - Fri 8:00 AM - 4:00 PM  Fees: Free   Phone: (506) 402-2635 Email: info@[facility].[employer] Website: https://[facility].org/          Important Numbers & Websites       Emergency Services   911  [facility] Services   311  Poison Control   (815) 309-6129  Suicide Prevention Lifeline   (604) 624-1202 (TALK)  Child Abuse Hotline   (296) 101-7835 (4-A-Child)  Sexual Assault Hotline   (663) 767-1524 (HOPE)  National Runaway Safeline   (542) 399-3719 (RUNAWAY)  All-Options Talkline   (464) 593-4847  Substance Abuse Referral   (776) 671-8829 (HELP)

## 2024-01-08 ENCOUNTER — MYC REFILL (OUTPATIENT)
Dept: FAMILY MEDICINE | Facility: CLINIC | Age: 39
End: 2024-01-08
Payer: COMMERCIAL

## 2024-01-08 DIAGNOSIS — Z32.01 PREGNANCY TEST POSITIVE: ICD-10-CM

## 2024-01-11 RX ORDER — PRENATAL VIT/IRON FUM/FOLIC AC 27MG-0.8MG
1 TABLET ORAL DAILY
Qty: 90 TABLET | Refills: 3 | OUTPATIENT
Start: 2024-01-11

## 2024-01-14 ENCOUNTER — E-VISIT (OUTPATIENT)
Dept: FAMILY MEDICINE | Facility: CLINIC | Age: 39
End: 2024-01-14
Payer: COMMERCIAL

## 2024-01-14 DIAGNOSIS — K08.89 PAIN, DENTAL: Primary | ICD-10-CM

## 2024-01-14 PROCEDURE — 99207 PR NON-BILLABLE SERV PER CHARTING: CPT | Performed by: FAMILY MEDICINE

## 2024-01-14 NOTE — PATIENT INSTRUCTIONS
Thank you for choosing us for your care. Based on the information provided, I believe you need to be seen immediately.  Please go urgent care or see a dentist as soon as possible.     You will not be charged for this eVisit.

## 2024-02-01 ENCOUNTER — APPOINTMENT (OUTPATIENT)
Dept: OPTOMETRY | Facility: CLINIC | Age: 39
End: 2024-02-01
Payer: COMMERCIAL

## 2024-02-01 PROCEDURE — V2020 VISION SVCS FRAMES PURCHASES: HCPCS | Performed by: OPTOMETRIST

## 2024-02-01 PROCEDURE — V2100 LENS SPHER SINGLE PLANO 4.00: HCPCS | Mod: RT | Performed by: OPTOMETRIST

## 2024-02-29 ENCOUNTER — APPOINTMENT (OUTPATIENT)
Dept: OPTOMETRY | Facility: CLINIC | Age: 39
End: 2024-02-29
Payer: COMMERCIAL

## 2024-02-29 PROCEDURE — 92340 FIT SPECTACLES MONOFOCAL: CPT | Performed by: OPTOMETRIST

## 2024-06-18 ENCOUNTER — E-VISIT (OUTPATIENT)
Dept: FAMILY MEDICINE | Facility: CLINIC | Age: 39
End: 2024-06-18
Payer: COMMERCIAL

## 2024-06-18 DIAGNOSIS — K04.7 INFECTED TOOTH: ICD-10-CM

## 2024-06-18 DIAGNOSIS — G44.209 ACUTE NON INTRACTABLE TENSION-TYPE HEADACHE: Primary | ICD-10-CM

## 2024-06-18 PROCEDURE — 99422 OL DIG E/M SVC 11-20 MIN: CPT | Performed by: PHYSICIAN ASSISTANT

## 2024-06-18 RX ORDER — NAPROXEN 500 MG/1
500 TABLET ORAL 2 TIMES DAILY WITH MEALS
Qty: 30 TABLET | Refills: 0 | Status: SHIPPED | OUTPATIENT
Start: 2024-06-18 | End: 2024-07-25

## 2024-06-18 RX ORDER — METHOCARBAMOL 500 MG/1
500 TABLET, FILM COATED ORAL 4 TIMES DAILY PRN
Qty: 30 TABLET | Refills: 0 | Status: SHIPPED | OUTPATIENT
Start: 2024-06-18 | End: 2024-07-25

## 2024-06-18 RX ORDER — AMOXICILLIN 500 MG/1
500 CAPSULE ORAL 3 TIMES DAILY
Qty: 30 CAPSULE | Refills: 0 | Status: SHIPPED | OUTPATIENT
Start: 2024-06-18 | End: 2024-06-28

## 2024-07-25 ENCOUNTER — VIRTUAL VISIT (OUTPATIENT)
Dept: FAMILY MEDICINE | Facility: CLINIC | Age: 39
End: 2024-07-25
Payer: COMMERCIAL

## 2024-07-25 DIAGNOSIS — B37.31 CANDIDIASIS OF VAGINA: Primary | ICD-10-CM

## 2024-07-25 PROCEDURE — 99213 OFFICE O/P EST LOW 20 MIN: CPT | Mod: 95 | Performed by: NURSE PRACTITIONER

## 2024-07-25 RX ORDER — FLUCONAZOLE 150 MG/1
150 TABLET ORAL ONCE
Qty: 1 TABLET | Refills: 0 | Status: SHIPPED | OUTPATIENT
Start: 2024-07-25 | End: 2024-07-25

## 2024-07-25 NOTE — PROGRESS NOTES
"Homero is a 38 year old who is being evaluated via a billable video visit.    How would you like to obtain your AVS? [facility]  If the video visit is dropped, the invitation should be resent by: Text to cell phone: [phone]  Will anyone else be joining your video visit? No      If patient has telephone visit, have they been educated on video visit as preferred visit method and offered to change to video visit? N/A    Instructions Relayed to Patient by Virtual Roomer:     Patient is active on Minubo:   Relayed following to patient: \"It looks like you are active on Minubo, are you able to join the visit this way? If not, do you need us to send you a link now or would you like your provider to send a link via text or email when they are ready to initiate the visit?\"      Patient Confirmed they will join visit via: [employer]  Reminded patient to ensure they were logged on to virtual visit by arrival time listed.   Asked if patient has flexibility to initiate visit sooner than arrival time: patient stated yes, documented in appointment notes availability to initiate visit earlier than arrival time     If pediatric virtual visit, ensured pediatric patient along with parent/guardian will be present for video visit.     Patient offered the website [email or web address] and/or phone number to [employer] Help line: [phone]     Assessment & Plan     Candidiasis of vagina  Complains of vaginal itching and \"cottage cheese thick discharge\". Denied recent abx use. No fever or chills, no pelvic pain. No foul smell.   Same current partner  Fluconazole 150 mg PO one time. Return if symptoms not improving.   - fluconazole (DIFLUCAN) 150 MG tablet; Take 1 tablet (150 mg) by mouth once for 1 dose    Nicotine/Tobacco Cessation  She reports that she has been smoking. She has never used smokeless tobacco.  Nicotine/Tobacco Cessation Plan  Information offered: Patient not interested at this time        Subjective "   Homero is a 38 year old, presenting for the following health issues:  No chief complaint on file.        7/25/2024     3:19 PM   Additional Questions   Roomed by kg   Accompanied by self       Video Start Time:  1710    Vaginal Problem        Vaginal Symptoms  possible yeast infection   Onset/Duration: yesterday  Description:  Vaginal Discharge: curd-like   Itching (Pruritis): YES  Burning sensation:  No  Odor: No  Accompanying Signs & Symptoms:  Urinary symptoms: No  Abdominal pain: No  Fever: No  History:   Sexually active: YES  New Partner: No  Possibility of Pregnancy:  No  Recent antibiotic use: No  Previous vaginitis issues: No  Precipitating or alleviating factors: None  Therapies tried and outcome: none            Objective         Vitals:  No vitals were obtained today due to virtual visit.    Physical Exam   GENERAL: alert and no distress  RESP: No audible wheeze, cough, or visible cyanosis.    SKIN: Visible skin clear. No significant rash, abnormal pigmentation or lesions.  NEURO: Cranial nerves grossly intact.  Mentation and speech appropriate for age.  PSYCH: Appropriate affect, tone, and pace of words          Video-Visit Details    Type of service:  Video Visit   Video End Time: 1718  Originating Location (pt. Location): Other      Distant Location (provider location):  On-site  Platform used for Video Visit: Sheron  Signed Electronically by: FRANCIE Barreto CNP

## 2024-08-07 ENCOUNTER — TELEPHONE (OUTPATIENT)
Dept: FAMILY MEDICINE | Facility: CLINIC | Age: 39
End: 2024-08-07
Payer: COMMERCIAL

## 2024-08-07 NOTE — TELEPHONE ENCOUNTER
Patient Quality Outreach    Patient is due for the following:   Cervical Cancer Screening - PAP Needed  Physical Preventive Adult Physical      Topic Date Due    Pneumococcal Vaccine (1 of 2 - PCV) Never done    Hepatitis B Vaccine (1 of 3 - 19+ 3-dose series) Never done    Diptheria Tetanus Pertussis (DTAP/TDAP/TD) Vaccine (1 - Tdap) Never done    COVID-19 Vaccine (1 - 2023-24 season) Never done       Next Steps:   Schedule a Adult Preventative    Type of outreach:    Sent letter.      Questions for provider review:    None           Breann Jackson MA

## 2024-10-03 ENCOUNTER — TELEPHONE (OUTPATIENT)
Dept: FAMILY MEDICINE | Facility: CLINIC | Age: 39
End: 2024-10-03
Payer: COMMERCIAL

## 2024-10-03 NOTE — TELEPHONE ENCOUNTER
"Attempted to call patient to discuss need for ED follow up d/t anemia (see below), but call went to  and  full, unable to leave message.    If patient calls back,    Please route to RN line, triage s/s if needed. ED provider mentioned Hgb 6.1 but patient declining going to another ED to get transfusion, does have hx anemia. Offer same day for ED follow up if patient OK with this, has seen [facility] in the past for anemia last year. Ensure patient knows primary care is unable to do transfusions in office, if provider is willing, would have to place orders for outpatient infusion (either iron or blood transfusion) and then schedule with them      Brigitte Paz, RN, BSN  [facility]    ___________________________________________    Called Dr. Dupont back to get a bit more information on message below, as with a Hgb 6.1, usually recommend transfusion.     Dr. Dupont mentioned at the [facility] ED they only have about 4 units of pRBCs available and these are only to be used for essentially mass transfusion emergencies. Patient also there for abd pain and mentioned long hx low Hgb, stated she did not want a transfusion anyways (offered to send down to [facility] or [facility] but patient declined this). Likely discharging today. Provider was reaching out as a courtesy to patient to see if we could get her a follow up with primary care and maybe we'd be able to place outpatient transfusion orders and/or iron infusions. Provider aware PCP office cannot physically do the transfusions at our clinic, it would have to be referred.     Provider also mentioned patient is supposed to be taking oral iron but hasn't been taking this as it makes her nauseated, patient also recently finished her period and mentioned her hgb is \"always like this.\"    Informed provider we'll review her chart and see if we can get her in to office tomorrow for a follow up, will call patient directly re: this, otherwise would " likely recommend infusion at ED. Provider aware.    Per chart, patient does not have specific PCP that she sees here, but has seen many [facility] providers. Per RN review of chart, patient does have hx anemia.

## 2024-10-03 NOTE — TELEPHONE ENCOUNTER
Reason for Call:  Appointment Request    Patient requesting this type of appt:  Hospital/ED Follow-Up     Requested provider: No Ref-Primary, Physician    Reason patient unable to be scheduled: Not within requested timeframe    When does patient want to be seen/preferred time: Same day    Comments: Provider--Johnathan Dupont from [facility] in [place]. Patient has a hemoglobin of 6.1 and wants patient to follow up with primary care ASAP. Pt will be discharged from hospital tomorrow and should follow up ASAP. Is it okay to take a same day slot with a provider for patient to be seen on 10/4/24 or early next week?     Could we send this information to you in [facility] or would you prefer to receive a phone call?:   Patient would prefer a phone call   Okay to leave a detailed message?: Yes at Cell number on file:    Telephone Information:   Mobile [phone]       Call taken on 10/3/2024 at 2:27 PM by Albania Stevens

## 2024-10-04 NOTE — TELEPHONE ENCOUNTER
This writer attempted to contact patient on 10/04/24      Reason for call schedule follow up and unable to leave message.      If patient calls back:   Route to RN line, triage s/s anemia (Hgb 6.1 in [facility] ED yesterday and declined going to main [facility] ED for transfusion). Schedule ED follow up if able      Brigitte Paz, RN, BSN  [facility]

## 2024-10-06 ENCOUNTER — HEALTH MAINTENANCE LETTER (OUTPATIENT)
Age: 39
End: 2024-10-06

## 2024-10-07 NOTE — TELEPHONE ENCOUNTER
This writer attempted to contact patient on 10/07/24      Reason for call transfusion need and left message.      If patient calls back:   Registered Nurse called. Follow Triage Call workflow        Rachel Meade RN

## 2024-10-08 NOTE — TELEPHONE ENCOUNTER
3 attempts made to contact patient, unable to reach patient. Does not have listed PCP on file, has seen many different providers through  and at .     Will send MyC asking if she wants to follow up after ED visit and to call our office. Will close this encounter.      Brigitte Paz, RN, BSN  [facility]

## 2024-11-27 ENCOUNTER — HOSPITAL ENCOUNTER (EMERGENCY)
Facility: CLINIC | Age: 39
Discharge: HOME | End: 2024-11-27
Payer: COMMERCIAL

## 2024-11-29 ENCOUNTER — HOSPITAL ENCOUNTER (EMERGENCY)
Facility: CLINIC | Age: 39
Discharge: HOME | End: 2024-11-29
Attending: EMERGENCY MEDICINE | Admitting: EMERGENCY MEDICINE
Payer: COMMERCIAL

## 2024-11-29 ENCOUNTER — APPOINTMENT (OUTPATIENT)
Dept: ULTRASOUND IMAGING | Facility: CLINIC | Age: 39
End: 2024-11-29
Attending: EMERGENCY MEDICINE
Payer: COMMERCIAL

## 2024-11-29 VITALS
TEMPERATURE: 97.4 F | SYSTOLIC BLOOD PRESSURE: 166 MMHG | OXYGEN SATURATION: 95 % | DIASTOLIC BLOOD PRESSURE: 119 MMHG | HEART RATE: 105 BPM | RESPIRATION RATE: 22 BRPM

## 2024-11-29 DIAGNOSIS — O20.0 THREATENED MISCARRIAGE IN EARLY PREGNANCY: ICD-10-CM

## 2024-11-29 LAB
ACANTHOCYTES BLD QL SMEAR: SLIGHT
ALBUMIN UR-MCNC: 20 MG/DL
APPEARANCE UR: CLEAR
BASOPHILS # BLD AUTO: 0 10E3/UL (ref 0–0.2)
BASOPHILS NFR BLD AUTO: 1 %
BILIRUB UR QL STRIP: NEGATIVE
COLOR UR AUTO: ABNORMAL
ELLIPTOCYTES BLD QL SMEAR: SLIGHT
EOSINOPHIL # BLD AUTO: 0.3 10E3/UL (ref 0–0.7)
EOSINOPHIL NFR BLD AUTO: 4 %
ERYTHROCYTE [DISTWIDTH] IN BLOOD BY AUTOMATED COUNT: 22.3 % (ref 10–15)
GLUCOSE UR STRIP-MCNC: NEGATIVE MG/DL
HCG INTACT+B SERPL-ACNC: 6109 MIU/ML
HCT VFR BLD AUTO: 29.3 % (ref 35–47)
HGB BLD-MCNC: 7.7 G/DL (ref 11.7–15.7)
HGB UR QL STRIP: NEGATIVE
HYALINE CASTS: 1 /LPF
IMM GRANULOCYTES # BLD: 0 10E3/UL
IMM GRANULOCYTES NFR BLD: 0 %
KETONES UR STRIP-MCNC: NEGATIVE MG/DL
LEUKOCYTE ESTERASE UR QL STRIP: ABNORMAL
LYMPHOCYTES # BLD AUTO: 2.4 10E3/UL (ref 0.8–5.3)
LYMPHOCYTES NFR BLD AUTO: 39 %
MCH RBC QN AUTO: 16.3 PG (ref 26.5–33)
MCHC RBC AUTO-ENTMCNC: 26.3 G/DL (ref 31.5–36.5)
MCV RBC AUTO: 62 FL (ref 78–100)
MONOCYTES # BLD AUTO: 0.4 10E3/UL (ref 0–1.3)
MONOCYTES NFR BLD AUTO: 6 %
MUCOUS THREADS #/AREA URNS LPF: PRESENT /LPF
NEUTROPHILS # BLD AUTO: 3 10E3/UL (ref 1.6–8.3)
NEUTROPHILS NFR BLD AUTO: 50 %
NITRATE UR QL: NEGATIVE
NRBC # BLD AUTO: 0 10E3/UL
NRBC BLD AUTO-RTO: 0 /100
PH UR STRIP: 7 [PH] (ref 5–7)
PLAT MORPH BLD: ABNORMAL
PLATELET # BLD AUTO: 338 10E3/UL (ref 150–450)
POLYCHROMASIA BLD QL SMEAR: SLIGHT
RBC # BLD AUTO: 4.71 10E6/UL (ref 3.8–5.2)
RBC MORPH BLD: ABNORMAL
RBC URINE: 2 /HPF
SP GR UR STRIP: 1.01 (ref 1–1.03)
SQUAMOUS EPITHELIAL: 2 /HPF
TARGETS BLD QL SMEAR: SLIGHT
UROBILINOGEN UR STRIP-MCNC: NORMAL MG/DL
WBC # BLD AUTO: 6.1 10E3/UL (ref 4–11)
WBC URINE: 9 /HPF

## 2024-11-29 PROCEDURE — 250N000013 HC RX MED GY IP 250 OP 250 PS 637: Performed by: EMERGENCY MEDICINE

## 2024-11-29 PROCEDURE — 85049 AUTOMATED PLATELET COUNT: CPT | Performed by: EMERGENCY MEDICINE

## 2024-11-29 PROCEDURE — 36415 COLL VENOUS BLD VENIPUNCTURE: CPT | Performed by: EMERGENCY MEDICINE

## 2024-11-29 PROCEDURE — 99285 EMERGENCY DEPT VISIT HI MDM: CPT | Mod: 25

## 2024-11-29 PROCEDURE — 85004 AUTOMATED DIFF WBC COUNT: CPT | Performed by: EMERGENCY MEDICINE

## 2024-11-29 PROCEDURE — 76801 OB US < 14 WKS SINGLE FETUS: CPT

## 2024-11-29 PROCEDURE — 81003 URINALYSIS AUTO W/O SCOPE: CPT | Performed by: EMERGENCY MEDICINE

## 2024-11-29 PROCEDURE — 84702 CHORIONIC GONADOTROPIN TEST: CPT | Performed by: EMERGENCY MEDICINE

## 2024-11-29 RX ORDER — ACETAMINOPHEN 500 MG
1000 TABLET ORAL ONCE
Status: COMPLETED | OUTPATIENT
Start: 2024-11-29 | End: 2024-11-29

## 2024-11-29 RX ADMIN — ACETAMINOPHEN 1000 MG: 500 TABLET, FILM COATED ORAL at 14:35

## 2024-11-29 ASSESSMENT — COLUMBIA-SUICIDE SEVERITY RATING SCALE - C-SSRS
6. HAVE YOU EVER DONE ANYTHING, STARTED TO DO ANYTHING, OR PREPARED TO DO ANYTHING TO END YOUR LIFE?: NO
2. HAVE YOU ACTUALLY HAD ANY THOUGHTS OF KILLING YOURSELF IN THE PAST MONTH?: NO
1. IN THE PAST MONTH, HAVE YOU WISHED YOU WERE DEAD OR WISHED YOU COULD GO TO SLEEP AND NOT WAKE UP?: NO

## 2024-11-29 ASSESSMENT — ACTIVITIES OF DAILY LIVING (ADL)
ADLS_ACUITY_SCORE: 41

## 2024-11-29 NOTE — ED PROVIDER NOTES
Emergency Department Note      History of Present Illness     Chief Complaint   Abdominal Pain      HPI   Homero Landry is a 39 year old female who presents with abdominal pain. The patient reports that for the last week she has had sharp lower abdominal and back pain. She explains that she is about 6 weeks pregnant and is concerned about a miscarriage due to noticing pinkish discharge coming through her vagina. She states that she was initially evaluated at the ED 4 days ago for this, but she was unable to have imaging done as she had to leave early. The patient affirms that she is having daily bowel movements which are all normal. She denies diarrhea and constipation. She notes having irregular periods that can sometimes be heavy and painful. No history of abdominal surgery.    Independent Historian   None    Review of External Notes   I reviewed the patient's ED visit note from 11/25/24 for abdominal pain    Past Medical History     Medical History and Problem List   Hypertension    Medications   The patient is not currently taking any prescribed medications.    Physical Exam     Patient Vitals for the past 24 hrs:   BP Temp Temp src Pulse Resp SpO2   11/29/24 1410 (!) 166/119 97.4  F (36.3  C) Temporal 105 22 95 %     Physical Exam  GENERAL: well developed, pleasant  HEAD: atraumatic  EYES: pupils reactive, extraocular muscles intact, conjunctivae normal  ENT:  mucus membranes moist  NECK:  trachea midline, normal range of motion  RESPIRATORY: no tachypnea, breath sounds clear to auscultation   CVS: normal S1/S2, no murmurs, intact distal pulses  ABDOMEN: soft, mild left lower quadrant pain, no distension.  MUSCULOSKELETAL: no deformities  SKIN: warm and dry, no acute rashes or ulceration  NEURO: GCS 15, cranial nerves intact, alert and oriented x3  PSYCH:  Mood/affect normal    Diagnostics     Lab Results   Labs Ordered and Resulted from Time of ED Arrival to Time of ED Departure   HCG QUANTITATIVE  Medication ; Rain 2mg    Please confirm if patient will be taking 2mg tablets in addition to the 5mg tablet before moving forward with authorization request. *5mg tablets were approved and entered*    Thank you.   PREGNANCY - Abnormal       Result Value    hCG Quantitative 6,109 (*)    ROUTINE UA WITH MICROSCOPIC REFLEX TO CULTURE - Abnormal    Color Urine Light Yellow      Appearance Urine Clear      Glucose Urine Negative      Bilirubin Urine Negative      Ketones Urine Negative      Specific Gravity Urine 1.012      Blood Urine Negative      pH Urine 7.0      Protein Albumin Urine 20 (*)     Urobilinogen Urine Normal      Nitrite Urine Negative      Leukocyte Esterase Urine Small (*)     Mucus Urine Present (*)     RBC Urine 2      WBC Urine 9 (*)     Squamous Epithelials Urine 2 (*)     Hyaline Casts Urine 1     CBC WITH PLATELETS AND DIFFERENTIAL - Abnormal    WBC Count 6.1      RBC Count 4.71      Hemoglobin 7.7 (*)     Hematocrit 29.3 (*)     MCV 62 (*)     MCH 16.3 (*)     MCHC 26.3 (*)     RDW 22.3 (*)     Platelet Count 338      % Neutrophils 50      % Lymphocytes 39      % Monocytes 6      % Eosinophils 4      % Basophils 1      % Immature Granulocytes 0      NRBCs per 100 WBC 0      Absolute Neutrophils 3.0      Absolute Lymphocytes 2.4      Absolute Monocytes 0.4      Absolute Eosinophils 0.3      Absolute Basophils 0.0      Absolute Immature Granulocytes 0.0      Absolute NRBCs 0.0     RBC AND PLATELET MORPHOLOGY - Abnormal    RBC Morphology Confirmed RBC Indices      Platelet Assessment        Value: Automated Count Confirmed. Platelet morphology is normal.    Acanthocytes Slight (*)     Elliptocytes Slight (*)     Polychromasia Slight (*)     Target Cells Slight (*)        Imaging   US OB <14 Weeks W Transvaginal   Final Result   IMPRESSION: Intrauterine gestational sac with a yolk sac identified,   but no fetal pole can be seen. Viability cannot be established.   Gestational age is approximately 5 weeks 3 days. Subchorionic   hemorrhage identified. Suggest serial beta hCG assessment and repeat   imaging as needed.       JAILYN ORTIZ MD            SYSTEM ID:  [id number]          Independent Interpretation    None    ED Course      Medications Administered   Medications   acetaminophen (TYLENOL) tablet 1,000 mg (1,000 mg Oral $Given 11/29/24 1435)       Discussion of Management   None    ED Course   ED Course as of 11/29/24 1640   Fri Nov 29, 2024   1416 I obtained history and examined the patient as noted above   1636 I rechecked the patient and explained findings.       Additional Documentation  None    Medical Decision Making / Diagnosis     CMS Diagnoses: None    MIPS       None    MDM   Homero Landry is a 39 year old female presents with left lower quadrant abdominal pain in setting of early pregnancy.  She is also noted some spotting.  Patient has had some abdominal pain in the past with prior CTs prior to this pregnancy without clear etiology.  Ultrasound shows IUP with no ectopic butBilirubin is slightly elevated.  No fetal pole.  Quantitative hCG is gone from 1944-5111 in the past 4 days.  Discussed findings with her and possibility of threatened miscarriage.  Discussed follow-up.    Disposition   The patient was discharged.     Diagnosis     ICD-10-CM    1. Threatened miscarriage in early pregnancy  O20.0              Scribe Disclosure:  I, Juan Clement, am serving as a scribe at 2:13 PM on 11/29/2024 to document services personally performed by Tdod Wheeler MD based on my observations and the provider's statements to me.        Todd Wheeler MD  11/29/24 8784

## 2024-11-29 NOTE — ED NOTES
Pt was already gone from the room when RN brought in AVS for discharge instructions. Unknown if pt had IV in arm.

## 2024-11-29 NOTE — ED TRIAGE NOTES
Pt arrives via triage presenting with LLQ abdominal pain for about a week. Pt reports estimated 6 weeks pregnant. Has not had an ultrasound yet. Denies N/V, constipation, or diarrhea.      Triage Assessment (Adult)       Row Name 11/29/24 1409          Triage Assessment    Airway WDL WDL        Respiratory WDL    Respiratory WDL WDL        Skin Circulation/Temperature WDL    Skin Circulation/Temperature WDL WDL        Cardiac WDL    Cardiac WDL WDL        Peripheral/Neurovascular WDL    Peripheral Neurovascular WDL WDL        Cognitive/Neuro/Behavioral WDL    Cognitive/Neuro/Behavioral WDL WDL

## 2024-12-05 NOTE — PATIENT INSTRUCTIONS
To Schedule an Appointment 24/7  Call: [phone]    If you have any questions regarding your visit, Please contact your care team.  [facility]: [phone]  [facility] HOURS TELEPHONE NUMBER   Cephas Agbeh, M.D.      Jeremiah Trujillo-Surgery Scheduler  Geeta-Surgery Scheduler       Monday - [place]:    8:00 am-4:45 pm  Tuesday - [place]:   8:00 am-4:45 pm  Friday-[place]:       8:00 am-4:45 pm  Typical Surgery Day:  Wednesday [facility]   [address] [place] [address]   [phone]   Fax [phone]    Imaging Scheduling all locations  [phone]      [facility]   [address] [place] [address] [place] [address]   [phone]    [facility]  [address] [facility] [address]  [phone]  Fax [phone]   Urgent Care locations:  [facility] Monday-Friday                               10 am - 8 pm  Saturday and Sunday                      9 am - 5 pm  Monday-Friday                              10 am- 8 pm  Saturday and Sunday                      9 am - 5 pm    [phone]   **Surgeries** Our Surgery Schedulers will contact you to schedule. If you do not receive a call within 3 business days, please call [phone].  If you need a medication refill, please contact your pharmacy. Please allow 3 business days for your refill to be completed.  As always, Thank you for trusting us with your healthcare needs!  see additional instructions from your care team below

## 2024-12-10 ENCOUNTER — OFFICE VISIT (OUTPATIENT)
Dept: OBGYN | Facility: CLINIC | Age: 39
End: 2024-12-10
Attending: OBSTETRICS & GYNECOLOGY
Payer: COMMERCIAL

## 2024-12-10 ENCOUNTER — ANCILLARY PROCEDURE (OUTPATIENT)
Dept: ULTRASOUND IMAGING | Facility: OTHER | Age: 39
End: 2024-12-10
Attending: OBSTETRICS & GYNECOLOGY
Payer: COMMERCIAL

## 2024-12-10 VITALS
WEIGHT: 170.7 LBS | HEART RATE: 56 BPM | SYSTOLIC BLOOD PRESSURE: 135 MMHG | BODY MASS INDEX: 23.81 KG/M2 | DIASTOLIC BLOOD PRESSURE: 90 MMHG

## 2024-12-10 DIAGNOSIS — Z32.01 PREGNANCY TEST POSITIVE: Primary | ICD-10-CM

## 2024-12-10 DIAGNOSIS — O20.0 THREATENED ABORTION: ICD-10-CM

## 2024-12-10 LAB — HCG INTACT+B SERPL-ACNC: ABNORMAL MIU/ML

## 2024-12-10 PROCEDURE — 84702 CHORIONIC GONADOTROPIN TEST: CPT | Performed by: OBSTETRICS & GYNECOLOGY

## 2024-12-10 PROCEDURE — 36415 COLL VENOUS BLD VENIPUNCTURE: CPT | Performed by: OBSTETRICS & GYNECOLOGY

## 2024-12-10 PROCEDURE — 76801 OB US < 14 WKS SINGLE FETUS: CPT | Mod: TC | Performed by: RADIOLOGY

## 2024-12-10 NOTE — PROGRESS NOTES
Homero is a 39 year old  referred here by self and ED for consultation regarding viability of pregnancy.  Patient has had 2 emergency room visits on 2024 and 2024. .  On the first ED visit she was having mild abdominal pains which have since resolved.  By the second ED visit she was having some spotting..  Her hCG had risen from  6109.  Ultrasound is as below.  No cardiac activity was noted.  Serial HCGs with follow-up ultrasound was recommended by radiology.  Narrative & Impression   US OB LESS THAN 14 WEEKS WITH TRANSVAGINAL SINGLE 2024 3:21 PM     HISTORY: Left lower abdominal pain, spotting.     COMPARISON: None.     FINDINGS: Intrauterine cystic structure with apparent yolk sac. Fetal  pole cannot be seen. Gestational age by mean sac size is 5 weeks 3  days. Viability cannot be established. Subchorionic hemorrhage is 1.1  x 1.2 x 0.5 cm.     Maternal adnexa: Left ovarian corpus luteum. Right ovary appears  unremarkable.     Patient reported LMP: 10/19/2024  LMP gestational age: 5 weeks 6 days                                                                      IMPRESSION: Intrauterine gestational sac with a yolk sac identified,  but no fetal pole can be seen. Viability cannot be established.  Gestational age is approximately 5 weeks 3 days. Subchorionic  hemorrhage identified. Suggest serial beta hCG assessment and repeat  imaging as needed.      JAILYN ORTIZ MD         ROS: Ten point review of systems was reviewed and negative except the above.    Gyne: - abn pap (last pap ), - STD's    History reviewed. No pertinent past medical history.  History reviewed. No pertinent surgical history.  There is no problem list on file for this patient.      ALL/Meds: Her medication and allergy histories were reviewed and are documented in their appropriate chart areas.    SH: - tob, - EtOH,     FH: Her family history was reviewed and documented in its appropriate chart area.    PE: BP (!) 135/90  (BP Location: Right arm, Patient Position: Sitting, Cuff Size: Adult Regular)   Pulse 56   Wt 77.4 kg (170 lb 11.2 oz)   LMP 10/17/2024 (Approximate)   Breastfeeding Unknown   BMI 23.81 kg/m    Body mass index is 23.81 kg/m .    General Appearance:  healthy, alert, active, no distress  HEENT: NCAT  Abdomen: Soft, nontender.  Normal bowel sounds.  No masses    A/P      ICD-10-CM    1. Pregnancy test positive  Z32.01 US OB < 14 Weeks Single     hCG Quantitative Pregnancy     hCG Quantitative Pregnancy      2. Threatened   O20.0 US OB < 14 Weeks Single     hCG Quantitative Pregnancy     hCG Quantitative Pregnancy        We discussed her hCG levels and ultrasound findings.  We discussed the risk of of viable intrauterine pregnancy versus missed AB..  If there is her viable pregnancy she will follow-up for prenatal care.  Reviewed that miscarriage occurs ~ 1 in 5 pregnancy events and that there was no direct event or prevention  that the patient could have avoided or performed.  Discussed that there are many etiologies for miscarriage, the most common being a genetic anomaly.  Reviewed that risk of miscarriage for next pregnancy is not elevated by the current event.  She will be notified of the ultrasound and hCG test results..    CEPHAS AGBEH, MD.

## 2024-12-17 DIAGNOSIS — K04.7 INFECTED TOOTH: ICD-10-CM

## 2024-12-17 NOTE — TELEPHONE ENCOUNTER
Clinic RN: Please investigate patient's chart or contact patient if the information cannot be found because this medication was prescribed for an acute condition. Confirm current symptoms/need for medication and possible need for appointment. If necessary, document reason and route refill encounter to provider for approval or denial.    Tio Joshi, RN, BSN, MSN  RN Lead

## 2024-12-17 NOTE — TELEPHONE ENCOUNTER
This writer attempted to contact patient on 12/17/24      Reason for call medication and left message.      If patient calls back:   Registered Nurse called.     FADY RamirezN, RN  [facility]

## 2024-12-18 NOTE — TELEPHONE ENCOUNTER
This writer attempted to contact patient on 12/18/24      Reason for call refill request and left message.      If patient calls back:   Route to RN line. Last time prescription was sent was in June 2024 for infected tooth via eVisit. If patient c/f infected tooth again, please recommend visit or call dentist      Brigitte Paz RN, BSN  [facility]  [place], [place] and [place]

## 2024-12-20 NOTE — TELEPHONE ENCOUNTER
This writer attempted to contact patient  on 12/20/24. Third Attempt.       Reason for call refill request and left message.      If patient calls back:      Route to RN line. Last time prescription was sent was in June 2024 for infected tooth via eVisit. If patient c/f infected tooth again, please recommend visit or call dentist     Dari RICO,  RN  [facility]

## 2024-12-23 RX ORDER — AMOXICILLIN 500 MG/1
CAPSULE ORAL
Qty: 30 CAPSULE | Refills: 0 | OUTPATIENT
Start: 2024-12-23

## 2024-12-23 NOTE — TELEPHONE ENCOUNTER
This writer attempted to contact patient  on 12/23/24. Fourth attempt.       Reason for call refill request and left message.      If patient calls back:   Route to RN line  Route to RN line. Last time prescription was sent was in June 2024 for infected tooth via eVisit. If patient c/f infected tooth again, please recommend visit or call dentist   Fourth attempt, closing encounter.     Dari SHRESTHAN,  RN  [facility]

## 2024-12-30 ENCOUNTER — HOSPITAL ENCOUNTER (EMERGENCY)
Facility: CLINIC | Age: 39
Discharge: LEFT BEFORE DISCHARGE | End: 2024-12-30
Admitting: EMERGENCY MEDICINE
Payer: COMMERCIAL

## 2024-12-30 VITALS
BODY MASS INDEX: 23.29 KG/M2 | WEIGHT: 167 LBS | DIASTOLIC BLOOD PRESSURE: 77 MMHG | HEART RATE: 114 BPM | TEMPERATURE: 97.3 F | RESPIRATION RATE: 18 BRPM | SYSTOLIC BLOOD PRESSURE: 123 MMHG | OXYGEN SATURATION: 100 %

## 2024-12-30 LAB
ALBUMIN SERPL BCG-MCNC: 4.2 G/DL (ref 3.5–5.2)
ALBUMIN UR-MCNC: 50 MG/DL
ALP SERPL-CCNC: 91 U/L (ref 40–150)
ALT SERPL W P-5'-P-CCNC: 8 U/L (ref 0–50)
ANION GAP SERPL CALCULATED.3IONS-SCNC: 11 MMOL/L (ref 7–15)
APPEARANCE UR: ABNORMAL
AST SERPL W P-5'-P-CCNC: 24 U/L (ref 0–45)
BACTERIA #/AREA URNS HPF: ABNORMAL /HPF
BASOPHILS # BLD AUTO: 0 10E3/UL (ref 0–0.2)
BASOPHILS NFR BLD AUTO: 0 %
BILIRUB SERPL-MCNC: 0.3 MG/DL
BILIRUB UR QL STRIP: NEGATIVE
BUN SERPL-MCNC: 5.6 MG/DL (ref 6–20)
CALCIUM SERPL-MCNC: 11 MG/DL (ref 8.8–10.4)
CHLORIDE SERPL-SCNC: 104 MMOL/L (ref 98–107)
COLOR UR AUTO: ABNORMAL
CREAT SERPL-MCNC: 0.54 MG/DL (ref 0.51–0.95)
EGFRCR SERPLBLD CKD-EPI 2021: >90 ML/MIN/1.73M2
EOSINOPHIL # BLD AUTO: 0.1 10E3/UL (ref 0–0.7)
EOSINOPHIL NFR BLD AUTO: 1 %
ERYTHROCYTE [DISTWIDTH] IN BLOOD BY AUTOMATED COUNT: 24.9 % (ref 10–15)
GLUCOSE SERPL-MCNC: 114 MG/DL (ref 70–99)
GLUCOSE UR STRIP-MCNC: NEGATIVE MG/DL
HCO3 SERPL-SCNC: 17 MMOL/L (ref 22–29)
HCT VFR BLD AUTO: 27 % (ref 35–47)
HGB BLD-MCNC: 7.9 G/DL (ref 11.7–15.7)
HGB UR QL STRIP: ABNORMAL
IMM GRANULOCYTES # BLD: 0.1 10E3/UL
IMM GRANULOCYTES NFR BLD: 0 %
KETONES UR STRIP-MCNC: NEGATIVE MG/DL
LEUKOCYTE ESTERASE UR QL STRIP: ABNORMAL
LYMPHOCYTES # BLD AUTO: 1.1 10E3/UL (ref 0.8–5.3)
LYMPHOCYTES NFR BLD AUTO: 9 %
MCH RBC QN AUTO: 18.9 PG (ref 26.5–33)
MCHC RBC AUTO-ENTMCNC: 29.3 G/DL (ref 31.5–36.5)
MCV RBC AUTO: 65 FL (ref 78–100)
MONOCYTES # BLD AUTO: 0.7 10E3/UL (ref 0–1.3)
MONOCYTES NFR BLD AUTO: 5 %
NEUTROPHILS # BLD AUTO: 10.3 10E3/UL (ref 1.6–8.3)
NEUTROPHILS NFR BLD AUTO: 84 %
NITRATE UR QL: NEGATIVE
NRBC # BLD AUTO: 0 10E3/UL
NRBC BLD AUTO-RTO: 0 /100
PH UR STRIP: 7 [PH] (ref 5–7)
PLATELET # BLD AUTO: 219 10E3/UL (ref 150–450)
POTASSIUM SERPL-SCNC: 4.4 MMOL/L (ref 3.4–5.3)
PROT SERPL-MCNC: 7.6 G/DL (ref 6.4–8.3)
RBC # BLD AUTO: 4.17 10E6/UL (ref 3.8–5.2)
RBC URINE: 23 /HPF
SODIUM SERPL-SCNC: 132 MMOL/L (ref 135–145)
SP GR UR STRIP: 1.01 (ref 1–1.03)
SQUAMOUS EPITHELIAL: 4 /HPF
UROBILINOGEN UR STRIP-MCNC: NORMAL MG/DL
WBC # BLD AUTO: 12.3 10E3/UL (ref 4–11)
WBC CLUMPS #/AREA URNS HPF: PRESENT /HPF
WBC URINE: >182 /HPF

## 2024-12-30 PROCEDURE — 36415 COLL VENOUS BLD VENIPUNCTURE: CPT | Performed by: EMERGENCY MEDICINE

## 2024-12-30 PROCEDURE — 80053 COMPREHEN METABOLIC PANEL: CPT | Performed by: EMERGENCY MEDICINE

## 2024-12-30 PROCEDURE — 87186 SC STD MICRODIL/AGAR DIL: CPT | Performed by: EMERGENCY MEDICINE

## 2024-12-30 PROCEDURE — 99281 EMR DPT VST MAYX REQ PHY/QHP: CPT

## 2024-12-30 PROCEDURE — 85025 COMPLETE CBC W/AUTO DIFF WBC: CPT | Performed by: EMERGENCY MEDICINE

## 2024-12-30 PROCEDURE — 81001 URINALYSIS AUTO W/SCOPE: CPT | Performed by: EMERGENCY MEDICINE

## 2024-12-30 ASSESSMENT — COLUMBIA-SUICIDE SEVERITY RATING SCALE - C-SSRS
2. HAVE YOU ACTUALLY HAD ANY THOUGHTS OF KILLING YOURSELF IN THE PAST MONTH?: NO
6. HAVE YOU EVER DONE ANYTHING, STARTED TO DO ANYTHING, OR PREPARED TO DO ANYTHING TO END YOUR LIFE?: NO
1. IN THE PAST MONTH, HAVE YOU WISHED YOU WERE DEAD OR WISHED YOU COULD GO TO SLEEP AND NOT WAKE UP?: NO

## 2024-12-30 ASSESSMENT — ACTIVITIES OF DAILY LIVING (ADL)
ADLS_ACUITY_SCORE: 41

## 2024-12-30 NOTE — ED TRIAGE NOTES
Pt presents for 2 days of extreme left sided back pain and low left sided abd pain- denies urinary symptoms, 10 weeks pregnant     Triage Assessment (Adult)       Row Name 12/30/24 1010          Triage Assessment    Airway WDL WDL        Respiratory WDL    Respiratory WDL WDL        Cardiac WDL    Cardiac WDL WDL        Cognitive/Neuro/Behavioral WDL    Cognitive/Neuro/Behavioral WDL WDL

## 2024-12-31 ENCOUNTER — HOSPITAL ENCOUNTER (EMERGENCY)
Facility: CLINIC | Age: 39
Discharge: HOME | End: 2024-12-31
Payer: COMMERCIAL

## 2024-12-31 ENCOUNTER — TELEPHONE (OUTPATIENT)
Dept: NURSING | Facility: CLINIC | Age: 39
End: 2024-12-31
Payer: COMMERCIAL

## 2024-12-31 VITALS
HEIGHT: 72 IN | DIASTOLIC BLOOD PRESSURE: 75 MMHG | HEART RATE: 93 BPM | SYSTOLIC BLOOD PRESSURE: 117 MMHG | WEIGHT: 170 LBS | BODY MASS INDEX: 23.03 KG/M2 | OXYGEN SATURATION: 99 % | TEMPERATURE: 99.7 F | RESPIRATION RATE: 20 BRPM

## 2024-12-31 DIAGNOSIS — N39.0 URINARY TRACT INFECTION: Primary | ICD-10-CM

## 2024-12-31 RX ORDER — CEPHALEXIN 500 MG/1
500 CAPSULE ORAL 4 TIMES DAILY
Qty: 12 CAPSULE | Refills: 0 | Status: SHIPPED | OUTPATIENT
Start: 2024-12-31 | End: 2025-01-03

## 2024-12-31 ASSESSMENT — ACTIVITIES OF DAILY LIVING (ADL)
ADLS_ACUITY_SCORE: 41

## 2024-12-31 NOTE — TELEPHONE ENCOUNTER
[facility]  LWBS after Triage    Reason for call: Lab Result Notification     Lab Result (including Rx patient on, if applicable).  If culture, copy of lab report at bottom.  Lab Result: Urine Culture (PRELIMINARY)  12/30/24 No antibiotic prescribed    Recommendation based on Pt assessment, NKDA's and PRELIMINARY Urine Culture and FSH Antibiogram Sensitivities:  Start Cefdinir 300 mg BID x 5 days    Creatinine Level (mg/dl)   Creatinine   Date Value Ref Range Status   12/30/2024 0.54 0.51 - 0.95 mg/dL Final    Creatinine clearance (ml/min), if applicable    Creatinine clearance cannot be calculated (Unknown ideal weight.)     RN Recommendations/Instructions per [facility] ED lab result protocol:   [facility] ED lab result protocol utilized: Urine Culture    Unable to reach patient/caregiver.   Left voicemail message requesting a call back to [phone] between 9 a.m. and 5:30 p.m. for patient's ED/UC lab results.  Letter pended to be sent via [employer].      12/31/24 at 4:02 pm pt returning VM left by ED Results RN  RN Recommendations/Instructions per [facility] ED lab result protocol:   [facility] ED lab result protocol utilized: Urine Culture  Instruct to start antibiotic: Cephalexin    Patient's current Symptoms at time of telephone encounter:   Pt states not worse however has headache and stomach pain.  Pt states she went back to ED today, however did leave due to wait time.  Pt will start antibiotic and will follow up with PCP if symptoms don't improve and will return to ED if symptoms worsen.    Does patient fit any of the following criteria?:     Has allergy to medications: No    Is breastfeeding: No    Is pregnant: Yes 10 1/2 weeks pregnant    On Coumadin/Warfarin: No      Patient/care giver notified to contact your PCP clinic or return to the Emergency department if your:  Symptoms return.  Symptoms do not improve after 3 days on antibiotic.  Symptoms do not resolve after  completing antibiotic.  Symptoms worsen or other concerning symptoms.       MERVAT Sanchez RN

## 2024-12-31 NOTE — ED TRIAGE NOTES
Left abdominal pain that radiates to her back with shortness of breath and headache. Pt also reports being 11 weeks pregnant. Pt come to ED yesterday, left before seeing MD. Pt had lab and urine tests done yesterday

## 2024-12-31 NOTE — LETTER
December 31, 2024        Homero Landry  [address]   [place] [address]          Dear Homero Landry:    You were seen in the [facility] Emergency Department at [facility] on 12/30/2024.  We are unable to reach you by phone, so we are sending you this letter.     It is important that you call [facility] Emergency Department lab result nurse at [phone], as we have information to relay to you AND/OR we MAY have to make some changes in your treatment.    Best time to call back is between 9AM and 5:30PM, 7 days a week.      Sincerely,     [facility] Emergency Department Lab Result RN  [phone]

## 2024-12-31 NOTE — LETTER
January 2, 2025        Homero Landry  [address]   [place] [address]          Dear Homero Landry:    You were seen in the [facility] Emergency Department at [facility] on 12/30/2024.  We are unable to reach you by phone, so we are sending you this letter.     It is important that you call [facility] Emergency Department lab result nurse at [phone], as we have information to relay to you AND/OR we MAY have to make some changes in your treatment.    Best time to call back is between 9AM and 5:30PM, 7 days a week.      Sincerely,     [facility] Emergency Department Lab Result RN  [phone]

## 2025-01-02 LAB
BACTERIA UR CULT: ABNORMAL
BACTERIA UR CULT: ABNORMAL

## 2025-01-02 NOTE — TELEPHONE ENCOUNTER
[facility]    Reason for call: Lab Result Notification     Lab Result (including Rx patient on, if applicable).  If culture, copy of lab report at bottom.  Lab Result: Urine Culture  12/31/24 Prescribed CephALEXin (KEFLEX) 500 MG capsule Take 1 capsule (500 mg) by mouth 4 times daily for 3 days., Disp-12 capsule, R-0, (SUSCEPTIBLE)    Creatinine Level (mg/dl)   Creatinine   Date Value Ref Range Status   12/30/2024 0.54 0.51 - 0.95 mg/dL Final    Creatinine clearance (ml/min), if applicable    Serum creatinine: 0.54 mg/dL 12/30/24 1015  Estimated creatinine clearance: 173.6 mL/min     RN Recommendations/Instructions per [facility] ED lab result protocol:   [facility] ED lab result protocol utilized: Urine Culture    Unable to reach patient/caregiver.   Left voicemail message requesting a call back to [phone] between 9 a.m. and 5:30 p.m. for patient's ED/ lab results.  Letter pended to be sent via [employer].    1/2/25 @ 10:28 am Pt returning VM left by ED Results Team  RN Recommendations/Instructions per [facility] ED lab result protocol:   [facility] ED lab result protocol utilized: Urine Culture    Patient's current Symptoms:   Pt states she is currently in ED at [facility] as she is not feeling any better.  Pt informed of Urine Culture results.        Brooke Robert RN

## 2025-01-07 ENCOUNTER — TELEPHONE (OUTPATIENT)
Dept: OBGYN | Facility: CLINIC | Age: 40
End: 2025-01-07
Payer: COMMERCIAL

## 2025-01-07 NOTE — TELEPHONE ENCOUNTER
[facility] Call Center    Phone Message    May a detailed message be left on voicemail: yes     Reason for Call: Other: . Writer scheduled all initial prenatal visits, pt is currently 11w3d, pt stated she has been in and out of the ED and was hoping for something sooner, nurse intake is on 1/22 which was the soonest appt, and first OB visit is on 1/23, please call pt if she can/should be seen sooner, thank you!    Action Taken: Message routed to:  Other: obgyn    Travel Screening: Not Applicable     Date of Service:

## 2025-01-07 NOTE — TELEPHONE ENCOUNTER
Dr. Jeffers has openings in [facility] tomorrow, 1/8.  I called pt to offer one of those appts. If this worked for pt I would work pt in for an intake prior.      Called pt to offer appt with Dr. Jeffers tomorrow however, pt didn't answer phone so I LVM.      If they are still available when pt calls back then please offer that to her.  If she is scheduled with Dr. Jeffers tomorrow please route encounter to triage so we can work pt in for an intake prior to her appt with Zeyad tomorrow.    Maryann Turner, RN-[facility]

## 2025-01-08 NOTE — TELEPHONE ENCOUNTER
Unable to reach patient via phone. Left message to call back at [phone].    Maryann Turner RN-[facility]

## 2025-01-09 NOTE — TELEPHONE ENCOUNTER
Closing encounter since we have been unable to reach pt.  If she calls back then we can review provider's schedules again to see if there are sooner openings.    I did add pt to Dr. Mendez's wait list.    Maryann Turner, MERVAT-[facility]

## 2025-01-14 ENCOUNTER — OFFICE VISIT (OUTPATIENT)
Dept: FAMILY MEDICINE | Facility: CLINIC | Age: 40
End: 2025-01-14
Payer: COMMERCIAL

## 2025-01-14 VITALS
BODY MASS INDEX: 22.48 KG/M2 | OXYGEN SATURATION: 97 % | WEIGHT: 166 LBS | HEIGHT: 72 IN | HEART RATE: 90 BPM | RESPIRATION RATE: 18 BRPM | DIASTOLIC BLOOD PRESSURE: 88 MMHG | TEMPERATURE: 98.4 F | SYSTOLIC BLOOD PRESSURE: 130 MMHG

## 2025-01-14 DIAGNOSIS — B37.31 YEAST INFECTION OF THE VAGINA: ICD-10-CM

## 2025-01-14 DIAGNOSIS — B96.89 BV (BACTERIAL VAGINOSIS): ICD-10-CM

## 2025-01-14 DIAGNOSIS — Z32.01 PREGNANCY TEST POSITIVE: Primary | ICD-10-CM

## 2025-01-14 DIAGNOSIS — N76.0 BV (BACTERIAL VAGINOSIS): ICD-10-CM

## 2025-01-14 PROCEDURE — 99213 OFFICE O/P EST LOW 20 MIN: CPT | Performed by: PHYSICIAN ASSISTANT

## 2025-01-14 RX ORDER — ONDANSETRON 4 MG/1
8 TABLET, ORALLY DISINTEGRATING ORAL EVERY 6 HOURS PRN
COMMUNITY
Start: 2025-01-02 | End: 2025-01-14

## 2025-01-14 RX ORDER — MICONAZOLE NITRATE 20 MG/G
CREAM TOPICAL 2 TIMES DAILY
Qty: 28 G | Refills: 1 | Status: SHIPPED | OUTPATIENT
Start: 2025-01-14 | End: 2025-01-17

## 2025-01-14 RX ORDER — PRENATAL VIT/IRON FUM/FOLIC AC 27MG-0.8MG
1 TABLET ORAL DAILY
Qty: 90 TABLET | Refills: 3 | Status: SHIPPED | OUTPATIENT
Start: 2025-01-14

## 2025-01-14 RX ORDER — ONDANSETRON 4 MG/1
8 TABLET, ORALLY DISINTEGRATING ORAL EVERY 6 HOURS PRN
Qty: 20 TABLET | Refills: 0 | Status: SHIPPED | OUTPATIENT
Start: 2025-01-14

## 2025-01-14 RX ORDER — METRONIDAZOLE 500 MG/1
500 TABLET ORAL 2 TIMES DAILY
Qty: 14 TABLET | Refills: 0 | Status: SHIPPED | OUTPATIENT
Start: 2025-01-14 | End: 2025-01-21

## 2025-01-14 NOTE — PROGRESS NOTES
Assessment & Plan     Pregnancy test positive  Has follow up with Ob scheduled.  Is out of her prenatal and would like to continue zofran for now for nausea.  Discussed avoiding use if able and try soda crackers, small meals ect.    - Prenatal Vit-Fe Fumarate-FA (PRENATAL MULTIVITAMIN W/IRON) 27-0.8 MG tablet; Take 1 tablet by mouth daily.  - ondansetron (ZOFRAN ODT) 4 MG ODT tab; Place 2 tablets (8 mg) under the tongue every 6 hours as needed for nausea.    BV (bacterial vaginosis)  Never received treatment from ED.  resent  - metroNIDAZOLE (FLAGYL) 500 MG tablet; Take 1 tablet (500 mg) by mouth 2 times daily for 7 days.    Yeast infection of the vagina  As above  - miconazole (MICATIN) 2 % external cream; Apply topically 2 times daily for 3 days.        MED REC REQUIRED  Post Medication Reconciliation Status:  Discharge medications reconciled and changed, see notes/orders        Fabiana Valentin is a 39 year old, presenting for the following health issues:  Hospital F/U      1/14/2025    12:10 PM   Additional Questions   Roomed by Chloe   Accompanied by self     HPI       ED/UC Followup:    Facility:  [facility]    Date of visit: 1-2-25   Reason for visit: UTI   Current Status: feeling better     Was given keflex at first and then switched to Augmentin and now symptoms are improved.  Seeing ob next week.  Was dehydrated when when to ER and got fluids which helped.  Was also treated for yeast and BV but pt was no aware and did not  the prescription.     No vaginal symptoms but mild stomach pain.  Still having vomiting.  Is about 11 weeks.              Objective    /88   Pulse 90   Temp 98.4  F (36.9  C) (Temporal)   Resp 18   Ht 1.829 m (6')   Wt 75.3 kg (166 lb)   LMP 10/17/2024 (Exact Date)   SpO2 97%   BMI 22.51 kg/m    Body mass index is 22.51 kg/m .  Physical Exam  Constitutional:       General: She is not in acute distress.  Cardiovascular:      Rate and Rhythm: Normal rate and  regular rhythm.   Pulmonary:      Effort: Pulmonary effort is normal.      Breath sounds: Normal breath sounds.   Abdominal:      Tenderness: There is no right CVA tenderness or left CVA tenderness.   Neurological:      Mental Status: She is alert.   Psychiatric:         Mood and Affect: Mood normal.                    Signed Electronically by: Hillary Galloway PA-C

## 2025-01-22 ENCOUNTER — VIRTUAL VISIT (OUTPATIENT)
Dept: OBGYN | Facility: CLINIC | Age: 40
End: 2025-01-22
Attending: OBSTETRICS & GYNECOLOGY
Payer: COMMERCIAL

## 2025-01-22 DIAGNOSIS — Z34.80 PRENATAL CARE, SUBSEQUENT PREGNANCY, UNSPECIFIED TRIMESTER: Primary | ICD-10-CM

## 2025-01-22 LAB
ABO + RH BLD: NORMAL
BLD GP AB SCN SERPL QL: NEGATIVE
SPECIMEN EXP DATE BLD: NORMAL

## 2025-01-22 PROCEDURE — 99207 PR NO CHARGE NURSE ONLY: CPT | Mod: 93

## 2025-01-22 NOTE — PROGRESS NOTES
NPN nurse visit done over the phone. Pt will be given NPN folder and book at her upcoming appt.  Discussed optional screening available to assess chromosomal anomalies. Questions answered. Informed pt of the clinic structure (on-call does deliveries for the day, may be male or female doctor). Pt advised to call the clinic if she has any questions or concerns related to her pregnancy. Prenatal labs will be obtained at her upcoming appt. New prenatal visit scheduled on 25 with Dr Mendez.    13w4d        Menstrual cycles: irregular  Date of positive pregnancy test: 2024  Medications stopped upon pos HPT: none    Last pap: unknown.  DUE    Hx:    Sickle cell trait, Anemia (transfusion x1), down syndrome history within pt's family, Pt's father and brother had TB many years ago and were treated per pt.  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  FOB: Aidan, their fist child together.  NIPT:  WANTS.  Stopped marijuana when she found out she was pregnant.  Moved here from [place].        Patient supplied answers from flow sheet for:  Prenatal OB Questionnaire.  Past Medical History  Have you ever recieved care for your mental health? : No  Have you ever been in a major accident or suffered serious trauma?: No  Within the last year, has anyone hit, slapped, kicked or otherwise hurt you?: No  In the last year, has anyone forced you to have sex when you didn't want to?: No    Past Medical History 2   Have you ever received a blood transfusion?: (!) Yes (had anemia)  Would you accept a blood transfusion if was medically recommended?: Yes  Does anyone in your home smoke?: (!) Yes (pt and boyfriend)   Is your blood type Rh negative?: Unknown  Have you ever ?: No  Have you been hospitalized for a nonsurgical reason excluding normal delivery?: No  Have you ever had an abnormal pap smear?: No    Past Medical History (Continued)  Do you have a history of abnormalities of the uterus?: No  Did your mother take ANDREW  or any other hormones when she was pregnant with you?: No  Do you have any other problems we have not asked about which you feel may be important to this pregnancy?: No    Fauzia Clarke LPN on 1/22/2025 at 9:04 AM

## 2025-01-22 NOTE — PATIENT INSTRUCTIONS
Learning About Pregnancy  Your Care Instructions     Your health in the early weeks of your pregnancy is particularly important for your baby's health. Take good care of yourself. Anything you do that harms your body can also harm your baby.  Make sure to go to all of your doctor appointments. Regular checkups will help keep you and your baby healthy.  How can you care for yourself at home?  Diet    Choose healthy foods like fruits, vegetables, whole grains, lean proteins, and healthy fats.     Choose foods that are good sources of calcium, iron, and folate. You can try dairy products, dark leafy greens, fortified orange juice and cereals, almonds, broccoli, dried fruit, and beans.     Do not skip meals or go for many hours without eating. If you are nauseated, try to eat a small, healthy snack every 2 to 3 hours.     Avoid fish that are high in mercury. These include shark, swordfish, shilpi mackerel, marlin, orange roughy, and bigeye tuna, as well as tilefish from the [facility].     It's okay to eat up to 8 to 12 ounces a week of fish that are low in mercury or up to 4 ounces a week of fish that have medium levels of mercury. Some fish that are low in mercury are salmon, shrimp, canned light tuna, cod, and tilapia. Some fish that have medium levels of mercury are halibut and white albacore tuna.     Drink plenty of fluids. If you have kidney, heart, or liver disease and have to limit fluids, talk with your doctor before you increase the amount of fluids you drink.     Limit caffeine to about 200 to 300 mg per day. On average, a cup of brewed coffee has around 80 to 100 mg of caffeine.     Do not drink alcohol, such as beer, wine, or hard liquor.     Take a multivitamin that contains at least 400 micrograms (mcg) of folic acid to help prevent birth defects. Fortified cereal and whole wheat bread are good additional sources of folic acid.     Increase the calcium in your diet. Try to drink a quart of skim milk  each day. You may also take calcium supplements and choose foods such as cheese and yogurt.   Lifestyle    Make sure you go to your follow-up appointments.     Get plenty of rest. You may be unusually tired while you are pregnant.     Get at least 30 minutes of exercise on most days of the week. Walking is a good choice. If you have not exercised in the past, start out slowly. Take several short walks each day.     Do not smoke. If you need help quitting, talk to your doctor about stop-smoking programs. These can increase your chances of quitting for good.     Do not touch cat feces or litter boxes. Also, wash your hands after you handle raw meat, and fully cook all meat before you eat it. Wear gloves when you work in the yard or garden, and wash your hands well when you are done. Cat feces, raw or undercooked meat, and contaminated dirt can cause an infection that may harm your baby or lead to a miscarriage.     Avoid things that can make your body too hot and may be harmful to your baby, such as a hot tub or sauna. Or talk with your doctor before doing anything that raises your body temperature. Your doctor can tell you if it's safe.     Avoid chemical fumes, paint fumes, or poisons.     Do not use illegal drugs, marijuana, or alcohol.   Medicines    Review all of your medicines with your doctor. Some of your routine medicines may need to be changed to protect your baby.     Use acetaminophen (Tylenol) to relieve minor problems, such as a mild headache or backache or a mild fever with cold symptoms. Do not use nonsteroidal anti-inflammatory drugs (NSAIDs), such as ibuprofen (Advil, Motrin) or naproxen (Aleve), unless your doctor says it is okay.     Do not take two or more pain medicines at the same time unless the doctor told you to. Many pain medicines have acetaminophen, which is Tylenol. Too much acetaminophen (Tylenol) can be harmful.     Take your medicines exactly as prescribed. Call your doctor if you  "think you are having a problem with your medicine.   To manage morning sickness    Keep food in your stomach, but not too much at once. Try eating five or six small meals a day instead of three large meals.     For nausea when you wake up, eat a small snack, such as a couple of crackers or pretzels, before rising. Allow a few minutes for your stomach to settle before you slowly get up.     Try to avoid smells and foods that make you feel nauseated. High-fat or greasy foods, milk, and coffee may make nausea worse. Some foods that may be easier to tolerate include cold, spicy, sour, and salty foods.     Drink enough fluids. Water and other caffeine-free drinks are good choices.     Take your prenatal vitamins at night on a full stomach.     Try foods and drinks made with jennifer. Jennifer may help with nausea.     Get lots of rest. Morning sickness may be worse when you are tired.     Talk to your doctor about over-the-counter products, such as vitamin B6 or doxylamine, to help relieve symptoms.     Try a P6 acupressure wrist band. These anti-nausea wristbands help some people.   Follow-up care is a key part of your treatment and safety. Be sure to make and go to all appointments, and call your doctor if you are having problems. It's also a good idea to know your test results and keep a list of the medicines you take.  Where can you learn more?  Go to https://www."[employer]".net/patiented  Enter E868 in the search box to learn more about \"Learning About Pregnancy.\"  Current as of: April 30, 2024  Content Version: 14.3    2024 [employer].   Care instructions adapted under license by your healthcare professional. If you have questions about a medical condition or this instruction, always ask your healthcare professional. [employer] disclaims any warranty or liability for your use of this information.    Weeks 6 to 10 of Your Pregnancy: Care Instructions  During these weeks of pregnancy, your body " "goes through many changes. You may start to feel different, both in your body and your emotions. Each pregnancy is different, so there's no \"right\" way to feel. These early weeks are a time to make healthy choices for you and your pregnancy.    Take a daily prenatal vitamin. Choose one with folic acid in it.   Avoid alcohol, tobacco, and drugs (including marijuana). If you need help quitting, talk to your doctor.     Drink plenty of liquids.  Be sure to drink enough water. And limit sodas, other sweetened drinks, and caffeine.     Choose foods that are good sources of calcium, iron, and folate.  You can try dairy products, dark leafy greens, fortified orange juice and cereals, almonds, broccoli, dried fruit, and beans.     Avoid foods that may be harmful.  Don't eat raw meat, deli meat, raw seafood, or raw eggs. Avoid soft cheese and unpasteurized dairy, like Brie and blue cheese. And don't eat fish that contains a lot of mercury, like shark and swordfish.     Don't touch herrera litter or cat poop.  They can cause an infection that could be harmful during pregnancy.     Avoid things that can make your body too hot.  For example, avoid hot tubs and saunas.     Soothe morning sickness.  Try eating 5 or 6 small meals a day, getting some fresh air, or using michelle to control symptoms.     Ask your doctor about flu and COVID-19 shots.  Getting them can help protect against infection.   Follow-up care is a key part of your treatment and safety. Be sure to make and go to all appointments, and call your doctor if you are having problems. It's also a good idea to know your test results and keep a list of the medicines you take.  Where can you learn more?  Go to https://[email or web address].net/patiented  Enter G112 in the search box to learn more about \"Weeks 6 to 10 of Your Pregnancy: Care Instructions.\"  Current as of: April 30, 2024  Content Version: 14.3    2024 [facility] [employer].   Care instructions adapted under license " by your healthcare professional. If you have questions about a medical condition or this instruction, always ask your healthcare professional. [employer] disclaims any warranty or liability for your use of this information.       Pregnancy: Managing Morning Sickness (01:48)  Your health professional recommends that you watch this short online health video.  Learn how to manage morning sickness during pregnancy.   Purpose: Learn how to manage morning sickness during pregnancy.  Goal: Learn how to manage morning sickness during pregnancy.    Watch: Scan the QR code or visit the link to view video       [email or web address].[email or web address]  Current as of: April 30, 2024  Content Version: 14.3    2024 [employer].   Care instructions adapted under license by your healthcare professional. If you have questions about a medical condition or this instruction, always ask your healthcare professional. [employer] disclaims any warranty or liability for your use of this information.    Pregnancy and Heartburn: Care Instructions  Overview     Heartburn is a common problem during pregnancy.  Heartburn happens when stomach acid backs up into the tube that carries food to the stomach. This tube is called the esophagus. Early in pregnancy, heartburn is caused by hormone changes that slow down digestion. Later on, it's also caused by the large uterus pushing up on the stomach.  Even though you can't fix the cause, there are things you can do to get relief. Treating heartburn during pregnancy focuses first on making lifestyle changes, like changing what and how you eat, and on taking medicines.  Heartburn usually improves or goes away after childbirth.  Follow-up care is a key part of your treatment and safety. Be sure to make and go to all appointments, and call your doctor if you are having problems. It's also a good idea to know your test results and keep a list of the medicines you take.  How can you  "care for yourself at home?  Eat small, frequent meals.  Avoid foods that make your symptoms worse, such as chocolate, peppermint, and spicy foods. Avoid drinks with caffeine, such as coffee, tea, and sodas.  Avoid bending over or lying down after meals.  Take a short walk after you eat.  If heartburn is a problem at night, do not eat for 2 hours before bedtime.  Take antacids like Mylanta, Maalox, Rolaids, or Tums. Do not take antacids that have sodium bicarbonate, magnesium trisilicate, or aspirin. Be careful when you take over-the-counter antacid medicines. Many of these medicines have aspirin in them. While you are pregnant, do not take aspirin or medicines that contain aspirin unless your doctor says it is okay.  If you're not getting relief, talk to your doctor. You may be able to take a stronger acid-reducing medicine.  When should you call for help?   Call your doctor now or seek immediate medical care if:    You have new or worse belly pain.     You are vomiting.   Watch closely for changes in your health, and be sure to contact your doctor if:    You have new or worse symptoms of reflux.     You are losing weight.     You have trouble or pain swallowing.     You do not get better as expected.   Where can you learn more?  Go to https://www.[employer].net/patiented  Enter U946 in the search box to learn more about \"Pregnancy and Heartburn: Care Instructions.\"  Current as of: April 30, 2024  Content Version: 14.3    2024 [employer].   Care instructions adapted under license by your healthcare professional. If you have questions about a medical condition or this instruction, always ask your healthcare professional. [employer] disclaims any warranty or liability for your use of this information.    Constipation: Care Instructions  Overview     Constipation means that you have a hard time passing stools (bowel movements). People pass stools from 3 times a day to once every 3 days. What is " normal for you may be different. Constipation may occur with pain in the rectum and cramping. The pain may get worse when you try to pass stools. Sometimes there are small amounts of bright red blood on toilet paper or the surface of stools. This is because of enlarged veins near the rectum (hemorrhoids).  A few changes in your diet and lifestyle may help you avoid ongoing constipation. Your doctor may also prescribe medicine to help loosen your stool.  Some medicines can cause constipation. These include pain medicines and antidepressants. Tell your doctor about all the medicines you take. Your doctor may want to make a medicine change to ease your symptoms.  Follow-up care is a key part of your treatment and safety. Be sure to make and go to all appointments, and call your doctor if you are having problems. It's also a good idea to know your test results and keep a list of the medicines you take.  How can you care for yourself at home?  Drink plenty of fluids. If you have kidney, heart, or liver disease and have to limit fluids, talk with your doctor before you increase the amount of fluids you drink.  Include high-fiber foods in your diet each day. These include fruits, vegetables, beans, and whole grains.  Get at least 30 minutes of exercise on most days of the week. Walking is a good choice. You also may want to do other activities, such as running, swimming, cycling, or playing tennis or team sports.  Take a fiber supplement, such as Citrucel or Metamucil, every day. Read and follow all instructions on the label.  Schedule time each day for a bowel movement. A daily routine may help. Take your time having a bowel movement, but don't sit for more than 10 minutes at a time. And don't strain too much.  Support your feet with a small step stool when you sit on the toilet. This helps flex your hips and places your pelvis in a squatting position.  Your doctor may recommend an over-the-counter laxative to relieve  "your constipation. Examples are Milk of Magnesia and MiraLax. Read and follow all instructions on the label. Do not use laxatives on a long-term basis.  When should you call for help?   Call your doctor now or seek immediate medical care if:    You have new or worse belly pain.     You have new or worse nausea or vomiting.     You have blood in your stools.   Watch closely for changes in your health, and be sure to contact your doctor if:    Your constipation is getting worse.     You do not get better as expected.   Where can you learn more?  Go to https://www.[employer].net/patiented  Enter P343 in the search box to learn more about \"Constipation: Care Instructions.\"  Current as of: October 19, 2023  Content Version: 14.3    2024 [employer].   Care instructions adapted under license by your healthcare professional. If you have questions about a medical condition or this instruction, always ask your healthcare professional. [employer] disclaims any warranty or liability for your use of this information.    Learning About High-Iron Foods  What foods are high in iron?     The foods you eat contain nutrients, such as vitamins and minerals. Iron is a nutrient. Your body needs the right amount to stay healthy and work as it should. You can use the list below to help you make choices about which foods to eat.  Here are some foods that contain iron. They have 1 to 2 milligrams of iron per serving.  Fruits  Figs (dried), 5 figs  Vegetables  Asparagus (canned), 6 hill  Mei, beet, Swiss chard, or turnip greens, 1 cup  Dried peas, cooked,   cup  Seaweed, spirulina (dried),   cup  Spinach, (cooked)   cup or (raw) 1 cup  Grains  Cereals, fortified with iron, 1 cup  Grits (instant, cooked), fortified with iron,   cup  Meats and other protein foods  Beans (kidney, lima, navy, white), canned or cooked,   cup  Beef or lamb, 3 oz  Chicken giblets, 3 oz  Chickpeas (garbanzo beans),   cup  Liver of beef, " "lamb, or pork, 3 oz  Oysters (cooked), 3 oz  Sardines (canned), 3 oz  Soybeans (boiled),   cup  Tofu (firm),   cup  Work with your doctor to find out how much of this nutrient you need. Depending on your health, you may need more or less of it in your diet.  Where can you learn more?  Go to https://www.[employer].net/patiented  Enter R005 in the search box to learn more about \"Learning About High-Iron Foods.\"  Current as of: September 20, 2023  Content Version: 14.3    2024 [employer].   Care instructions adapted under license by your healthcare professional. If you have questions about a medical condition or this instruction, always ask your healthcare professional. [employer] disclaims any warranty or liability for your use of this information.    Rh Antibodies Screening During Pregnancy: About This Test  What is it?     The Rh antibodies screening test is a blood test. It checks your blood for Rh antibodies. If you have Rh-negative blood and have been exposed to Rh-positive blood, your immune system may make antibodies to attack the Rh-positive blood. When a pregnant woman has these antibodies, it is called Rh sensitization.  Why is this test done?  The Rh antibodies screening test is done during pregnancy to find out if your baby is at risk for Rh disease. This can happen if you have Rh-negative blood and your baby has Rh-positive blood. If your Rh-negative blood mixes with Rh-positive blood, your immune system will make antibodies to attack the Rh-positive blood.  During pregnancy, these antibodies could attach to the baby's red blood cells. This can cause your baby to have serious health problems. The results of this test will help your doctor know how to best care for you and your baby during your pregnancy.  How do you prepare for the test?  In general, there's nothing you have to do before this test, unless your doctor tells you to.  How is the test done?  A health professional uses " "a needle to take a blood sample, usually from the arm.  What happens after the test?  You will probably be able to go home right away. It depends on the reason for the test.  You can go back to your usual activities right away.  Follow-up care is a key part of your treatment and safety. Be sure to make and go to all appointments, and call your doctor if you are having problems. It's also a good idea to keep a list of the medicines you take. Ask your doctor when you can expect to have your test results.  Where can you learn more?  Go to https://www.Parrable.Orthohub/patiented  Enter P722 in the search box to learn more about \"Rh Antibodies Screening During Pregnancy: About This Test.\"  Current as of: 2024  Content Version: 14.3    2024 [employer].   Care instructions adapted under license by your healthcare professional. If you have questions about a medical condition or this instruction, always ask your healthcare professional. [employer] disclaims any warranty or liability for your use of this information.    Learning About Preventing Rh Disease  What is Rh disease?     Rh disease can be a serious problem in pregnancy. It happens when substances called antibodies in the mother's blood cause red blood cells in her baby's blood to be destroyed. This can occur when the blood types of a mother and her baby do not match.  All blood has an Rh factor. This is what makes a blood type positive or negative. When you are Rh-negative, your baby may be Rh-negative or Rh-positive. If your baby has Rh-positive blood and it mixes with yours, your body will make antibodies. This is called Rh sensitization.  Most of the time, this is not a problem in a first pregnancy. But in future pregnancies, it could cause Rh disease.  A  with Rh disease has mild anemia and may have jaundice. In severe cases, anemia, jaundice, and swelling can be very dangerous or fatal. Some babies need to be delivered " "early. Some need special care in the NICU. A very sick baby will need a blood transfusion before or after birth.  Fortunately, Rh sensitization is usually easy to prevent.  That's why it's important to get your Rh status checked in your first trimester. It doesn't cause any warning signs. A blood test is the only way to know if you are Rh-sensitive or are at risk for it.  How can you prevent Rh disease?  If you are Rh-negative, your doctor gives you an Rh immune globulin shot (such as RhoGAM). It helps prevent your body from making the antibodies that attack your baby's red blood cells.  Timing is important. You need the shot at certain times during your pregnancy. And you need one anytime there is a chance that your baby's blood might mix with yours. That can happen with certain prenatal tests or when you have pregnancy bleeding, such as:  Right after any pregnancy loss, amniocentesis, or CVS testing.  After turning of a breech baby.  Before and maybe after childbirth. Your doctor gives you a shot around week 28. If your  is Rh-positive, you will have another shot.  Follow-up care is a key part of your treatment and safety. Be sure to make and go to all appointments, and call your doctor if you are having problems. It's also a good idea to know your test results and keep a list of the medicines you take.  Where can you learn more?  Go to https://www.[employer].net/patiented  Enter W177 in the search box to learn more about \"Learning About Preventing Rh Disease.\"  Current as of: 2024  Content Version: 14.3    2024 [employer].   Care instructions adapted under license by your healthcare professional. If you have questions about a medical condition or this instruction, always ask your healthcare professional. [employer] disclaims any warranty or liability for your use of this information.    Learning About Rh Immunoglobulin Shots  Introduction     An Rh immunoglobulin shot is " given to pregnant women who have Rh-negative blood.  You may have Rh-negative blood, and your baby may have Rh-positive blood. If the two types of blood mix, your body will make antibodies. This is called Rh sensitization. Most of the time, this is not a problem the first time you're pregnant. But it could cause problems in future pregnancies.  This shot keeps your body from making the antibodies. You get the shot around 28 weeks of pregnancy. After the birth, your baby's blood is tested. If the blood is Rh positive, you will get another shot. You may also get the shot if you have vaginal bleeding while you are pregnant or if you have a miscarriage. These shots protect future pregnancies.  Women with Rh negative blood will need this shot each time they get pregnant.  Example  Rh immunoglobulin (HypRho-D, MICRhoGAM, and RhoGAM)  Possible side effects  Rare side effects may include:  Some mild pain where you got the shot.  A slight fever.  An allergic reaction.  You may have other side effects not listed here. Check the information that comes with your medicine.  What to know about taking this medicine  You may need more than one shot. You may need the shot again:  After amniocentesis, fetal blood sampling, or chorionic villus sampling tests.  If you have bleeding in your second or third trimester.  After turning of a breech baby.  After an injury to the belly while you are pregnant.  After a miscarriage or an .  Before or right after treatment for an ectopic or a partial molar pregnancy.  Tell your doctor if you have any allergies or have had a bad response to medicines in the past.  If you get this shot within 3 months of getting a live-virus vaccine, the vaccine may not work. Your doctor will tell you if you need more vaccine.  Check with your doctor or pharmacist before you use any other medicines. This includes over-the-counter medicines. Make sure your doctor knows all of the medicines, vitamins, herbs,  "and supplements you take. Taking some medicines at the same time can cause problems.  Where can you learn more?  Go to https://www.[employer].net/patiented  Enter V615 in the search box to learn more about \"Learning About Rh Immunoglobulin Shots.\"  Current as of: April 30, 2024  Content Version: 14.3    2024 [employer].   Care instructions adapted under license by your healthcare professional. If you have questions about a medical condition or this instruction, always ask your healthcare professional. [employer] disclaims any warranty or liability for your use of this information.    Rubella ([place] Measles): Care Instructions  Overview  Rubella, also called [place] measles or 3-day measles, is a disease caused by a virus. It spreads by coughs, sneezes, and close contact. Rubella usually is mild and does not cause long-term problems. But if you are pregnant and get it, you can give the disease to your unborn baby. This can cause serious birth defects.  While you have rubella, you may get a rash and a mild fever, and the lymph glands in your neck may swell. Older children often have a fever, eye pain, a sore throat, and body aches. You can relieve most symptoms with care at home. Avoid being around others, especially pregnant people, until your rash has been gone for at least 4 days. People who have not had this disease before or have not had the vaccine have the greatest chance of getting the virus.  Follow-up care is a key part of your treatment and safety. Be sure to make and go to all appointments, and call your doctor if you are having problems. It's also a good idea to know your test results and keep a list of the medicines you take.  How can you care for yourself at home?  Drink plenty of fluids. If you have kidney, heart, or liver disease and have to limit fluids, talk with your doctor before you increase the amount of fluids you drink.  Get plenty of rest to help your body heal.  Take " "an over-the-counter pain medicine, such as acetaminophen (Tylenol), ibuprofen (Advil, Motrin), or naproxen (Aleve), to reduce fever and discomfort. Read and follow all instructions on the label. Do not give aspirin to anyone younger than 20. It has been linked to Reye syndrome, a serious illness.  Do not take two or more pain medicines at the same time unless the doctor told you to. Many pain medicines have acetaminophen, which is Tylenol. Too much acetaminophen (Tylenol) can be harmful.  Try not to scratch the rash. Put cold, wet cloths on the rash to reduce itching.  Do not smoke. Smoking can make your symptoms worse. If you need help quitting, talk to your doctor about stop-smoking programs and medicines. These can increase your chances of quitting for good.  Avoid contact with people who have never had rubella and who have not been immunized.  When should you call for help?   Call your doctor now or seek immediate medical care if:    You have a fever with a stiff neck or a severe headache.     You are sensitive to light or feel very sleepy or confused.   Watch closely for changes in your health, and be sure to contact your doctor if:    You do not get better as expected.   Where can you learn more?  Go to https://www.[employer].net/patiented  Enter B812 in the search box to learn more about \"Rubella ([place] Measles): Care Instructions.\"  Current as of: April 30, 2024  Content Version: 14.3    2024 [employer].   Care instructions adapted under license by your healthcare professional. If you have questions about a medical condition or this instruction, always ask your healthcare professional. [employer] disclaims any warranty or liability for your use of this information.    Gonorrhea and Chlamydia: About These Tests  What is it?  These tests use a sample of urine or other body fluid to look for the bacteria that cause these sexually transmitted infections (STIs). The fluid sample can come " "from the cervix, vagina, rectum, throat, or eyes.  Why is this test done?  These tests may be done to:  Find out if symptoms are caused by gonorrhea or chlamydia.  Check people who are at high risk of being infected with gonorrhea or chlamydia.  Retest people several months after they have been treated for gonorrhea or chlamydia.  Check for infection in your  if you had a gonorrhea or chlamydia infection at the time of delivery.  How can you prepare for the test?  If you are going to have a urine test, do not urinate for at least 1 hour before the test.  If you think you may have chlamydia or gonorrhea, don't have sexual intercourse until you get your test results. And you may want to have tests for other STIs, such as HIV.  How is the test done?  For a direct sample, a swab is used to collect body fluid from the cervix, vagina, rectum, throat, or eyes. Your doctor may collect the sample. Or you may be given instructions on how to collect your own sample.  For a urine sample, you will collect the urine that comes out when you first start to urinate. Don't wipe the genital area clean before you urinate.  How long does the test take?  The test will take a few minutes.  What happens after the test?  You will be able to go home right away.  You can go back to your usual activities right away.  If you do have an infection, don't have sexual intercourse for 7 days after you start treatment. And your sex partner(s) should also be treated.  Follow-up care is a key part of your treatment and safety. Be sure to make and go to all appointments, and call your doctor if you are having problems. It's also a good idea to keep a list of the medicines you take. Ask your doctor when you can expect to have your test results.  Where can you learn more?  Go to https://www.healthwise.net/patiented  Enter K976 in the search box to learn more about \"Gonorrhea and Chlamydia: About These Tests.\"  Current as of: 2024  Content " Version: 14.3    2024 [employer].   Care instructions adapted under license by your healthcare professional. If you have questions about a medical condition or this instruction, always ask your healthcare professional. [employer] disclaims any warranty or liability for your use of this information.    Trichomoniasis: About This Test  What is it?     This test uses a sample of urine or other body fluid to look for the tiny parasite that causes trichomoniasis (also called trich). The fluid sample can come from the vagina, cervix, or urethra. Your doctor may choose to use one or more of many available tests.  Why is it done?  A trich test may be done to:  Find out if symptoms are caused by trich.  Check people who are at high risk for being infected with trich.  Check after treatment to make sure that the infection is gone.  How do you prepare for the test?  If you are going to have a urine test, do not urinate for at least 1 hour before the test.  How is the test done?  For a direct sample, a swab is used to collect body fluid from the cervix, vagina, or urethra. Your doctor may collect the sample. Or you may be given instructions on how to collect your own sample.  For a urine sample, you will collect the urine that comes out when you first start to urinate. Don't wipe the area clean before you urinate.  How long does the test take?  It will take a few minutes to collect a sample.  What happens after the test?  You can go home right away.  You can go back to your usual activities right away.  You may get the test results the same day or several days later. It depends on the test used.  If you do have an infection, don't have sexual intercourse for 7 days after you start treatment. Your sex partner or partners should also be treated.  Follow-up care is a key part of your treatment and safety. Be sure to make and go to all appointments, and call your doctor if you are having problems. Ask your  "doctor when you can expect to have your test results.  Current as of: April 30, 2024  Content Version: 14.3    2024 [email or web address].   Care instructions adapted under license by your healthcare professional. If you have questions about a medical condition or this instruction, always ask your healthcare professional. [email or web address] disclaims any warranty or liability for your use of this information.    HIV Testing: Care Instructions  Overview  You can get tested for the human immunodeficiency virus (HIV). Most doctors use a blood test to check for HIV antibodies and antigens in your blood. It may also check for the genetic material (RNA) of HIV. Some tests use saliva to check for HIV antibodies. But these aren't as accurate. For example, they may give a false result if you've just been infected.  What do the results mean?        Normal (negative)   No HIV antibodies, antigens, or RNA were found.  You may need more testing. It can make sure your test results are correct.        Uncertain (indeterminate)   Test results didn't clearly show if you have an HIV infection.  HIV antibodies or antigens may not have formed yet.  Some other type of antibody or antigen may have affected the results.  You will need another test to be sure.        Abnormal (positive)   HIV antibodies, antigens, or RNA were found.  If you haven't had an RNA test yet, one will be done. If it's positive, you have HIV.  If your test result is positive, your doctor will talk to you. You will discuss starting treatment.  Follow-up care is a key part of your treatment and safety. Be sure to make and go to all appointments, and call your doctor if you are having problems. It's also a good idea to know your test results and keep a list of the medicines you take.  Where can you learn more?  Go to https://www.healthNephroGenex.net/patiented  Enter T792 in the search box to learn more about \"HIV Testing: Care Instructions.\"  Current as of: April 30, " 2024  Content Version: 14.3    2024 [employer].   Care instructions adapted under license by your healthcare professional. If you have questions about a medical condition or this instruction, always ask your healthcare professional. [employer] disclaims any warranty or liability for your use of this information.    Hepatitis C Virus Tests: About These Tests  What are they?     Hepatitis C virus tests are blood tests that check for substances in the blood that show whether you have hepatitis C now or had it in the past. The tests can also tell you what type of hepatitis C you have and how severe the disease is. This can help your doctor with treatment.  If the tests show that you have long-term hepatitis C, you need to take steps to prevent spreading the disease.  Why are these tests done?  You may need these tests if:  You have symptoms of hepatitis.  You may have been exposed to the virus. You are more likely to have been exposed to the virus if you inject drugs or are exposed to body fluids (such as if you are a health care worker).  You've had other tests that show you have liver problems.  You are 18 to 79 years old.  You have an HIV infection.  The tests also are done to help your doctor decide about your treatment and see how well it works.  How do you prepare for the test?  In general, there's nothing you have to do before this test, unless your doctor tells you to.  How is the test done?  A health professional uses a needle to take a blood sample, usually from the arm.  What happens after these tests?  You will probably be able to go home right away.  You can go back to your usual activities right away.  Follow-up care is a key part of your treatment and safety. Be sure to make and go to all appointments, and call your doctor if you are having problems. It's also a good idea to keep a list of the medicines you take. Ask your doctor when you can expect to have your test results.  Where  "can you learn more?  Go to https://www.Spume[employer].net/patiented  Enter W551 in the search box to learn more about \"Hepatitis C Virus Tests: About These Tests.\"  Current as of: April 30, 2024  Content Version: 14.3    2024 [employer].   Care instructions adapted under license by your healthcare professional. If you have questions about a medical condition or this instruction, always ask your healthcare professional. [employer] disclaims any warranty or liability for your use of this information.    Learning About Fetal Ultrasound Results  What is a fetal ultrasound?     Fetal ultrasound is a test that lets your doctor see an image of your baby. Your doctor learns information about your baby from this picture. You may find out, for example, if you are having a boy or a girl. But the main reason you have this test is to get information about your baby's health.  (You may hear your baby called a fetus. This is a common medical term for a baby that's growing in the mother's uterus.)  What kind of information can you learn from this test?  The findings of an ultrasound fall into two categories, normal and abnormal.  Normal  The fetus is the right size for its age.  The placenta is the expected size and does not cover the cervix.  There is enough amniotic fluid in the uterus.  No birth defects can be seen.  Abnormal  The fetus is small or large for its age.  The placenta covers the cervix.  There is too much or too little amniotic fluid in the uterus.  The fetus may have a birth defect.  What does an abnormal result mean?  Abnormal seems to imply that something is wrong with your baby. But what it means is that the test has shown something the doctor wants to take a closer look at.  And that's what happens next. Your doctor will talk to you about what further test or tests you may need.  What do the results mean?  Some of the things your doctor may see on an abnormal ultrasound include:  Echogenic " bowel.  The bowel looks very bright on the screen. This could mean that there's blood in the bowel. Or it could mean that something is blocking the small bowel.  Increased nuchal translucency.  The ultrasound measures the thickness at the back of the baby's neck. An increase in thickness is sometimes an early sign of Down syndrome.  Increased or decreased amniotic fluid.  The doctor will look for a reason for the level of amniotic fluid and will watch the pregnancy closely as it progresses.  Large ventricles.  Ventricles in the brain look larger than they should. Your doctor may take a closer look at the brain.  Renal pyelectasis/hydronephrosis.  The ultrasound measures the fluid around the kidney. If there is more fluid than expected, there is a chance of urinary tract or kidney problems.  Short long bones.  The ultrasound measures certain arm and leg bones. A long bone (humerus or femur) that is shorter than average could be a sign of Down syndrome.  Subchorionic hemorrhage.  An ultrasound can show bleeding under one of the membranes that surrounds the fetus. Some women don't have symptoms of bleeding. The ultrasound can find this problem when women are not bleeding from their vagina. Women who have this condition have a slightly higher chance of miscarriage.  What do you do now?  Take a deep breath, and let it out. Keep in mind that an abnormal finding on an ultrasound, after it's coupled with more information, may:  Turn out to be nothing.  Turn out to be something mild that won't affect the baby.  Turn out to be something more serious. But if this happens, early diagnosis helps you and your doctor plan treatment options sooner rather than later.  Your medical team is there for you. So are your family and friends. Ask questions, and get the help and support you need.  Follow-up care is a key part of your treatment and safety. Be sure to make and go to all appointments, and call your doctor if you are having  "problems. It's also a good idea to know your test results and keep a list of the medicines you take.  Where can you learn more?  Go to https://www.[employer].net/patiented  Enter K451 in the search box to learn more about \"Learning About Fetal Ultrasound Results.\"  Current as of: April 30, 2024  Content Version: 14.3    2024 [employer].   Care instructions adapted under license by your healthcare professional. If you have questions about a medical condition or this instruction, always ask your healthcare professional. [employer] disclaims any warranty or liability for your use of this information.    Learning About Prenatal Visits  Overview     Regular prenatal visits are very important during any pregnancy. These quick office visits may seem simple and routine. But they can help you have a safe and healthy pregnancy. Your doctor is watching for problems that can only be found through regular checkups. The visits also give you and your doctor time to build a good relationship.  After your first visit, you will most likely start on a schedule of monthly visits. In your third trimester, the visits will get more frequent. Based on your health, your age, and if you've had a normal, full-term pregnancy before, your doctor may want to see you more or less often.  At different times in your pregnancy, you will have exams and tests. Some are routine. Others are done only when there is a chance of a problem. Everything healthy you do for your body helps you have a healthy pregnancy. Rest when you need it. Eat well, drink plenty of water, and exercise regularly.  What happens during a prenatal visit?  You will have blood pressure checks, along with urine tests. You also may have blood tests. If you need to go to the bathroom while waiting for the doctor, tell the nurse. You will be given a sample cup so your urine can be tested.  You will be weighed and have your belly measured.  Your doctor may listen " to the fetal heartbeat with a special device.  At about 24 weeks, and possibly earlier in your pregnancy, your doctor will check your blood sugar (glucose tolerance test) for diabetes that can occur during pregnancy. This is gestational diabetes, which can be harmful.  You will have tests to check for infections that could harm your . These include group B streptococcus and hepatitis B.  Your doctor may do ultrasounds to check for problems. This also checks the position of the fetus. An ultrasound uses sound waves to produce a picture of the fetus.  You may get your vaccines updated.  Your doctor may ask you questions to check for signs of anxiety or depression. Tell your doctor if you feel sad, anxious, or hopeless for more than a few days.  You may have other tests at any time during your pregnancy.  Use your visits to discuss with your doctor any concerns you have.  How can you care for yourself at home?  Get plenty of rest.  Try to exercise every day, if your doctor says it is okay. If you have not exercised in the past, start out slowly. For example, you can take short walks each day.  Choose healthy foods, such as fruits, vegetables, whole grains, lean proteins, low-fat dairy, and healthy fats.  Drink plenty of fluids. Cut down on drinks with caffeine, such as coffee, tea, and cola. If you have kidney, heart, or liver disease and have to limit fluids, talk with your doctor before you increase the amount of fluids you drink.  Try to avoid chemical fumes, paint fumes, and poisons.  If you smoke, vape, or use alcohol, marijuana, or other drugs, quit or cut back as much as you can. Talk to your doctor if you need help quitting.  Review all of your medicines, including over-the-counter medicines and supplements, with your doctor. Some of your routine medicines may need to be changed. Do not stop or start taking any medicines without talking to your doctor first.  Follow-up care is a key part of your  "treatment and safety. Be sure to make and go to all appointments, and call your doctor if you are having problems. It's also a good idea to know your test results and keep a list of the medicines you take.  Where can you learn more?  Go to https://www.[employer].net/patiented  Enter J502 in the search box to learn more about \"Learning About Prenatal Visits.\"  Current as of: April 30, 2024  Content Version: 14.3    2024 [employer].   Care instructions adapted under license by your healthcare professional. If you have questions about a medical condition or this instruction, always ask your healthcare professional. [employer] disclaims any warranty or liability for your use of this information.    Intimate Partner Violence: Care Instructions  Overview     If you want to save this information but don't think it is safe to take it home, see if a trusted friend can keep it for you. Plan ahead. Know who you can call for help, and memorize the phone number.   Be careful online too. Your online activity may be seen by others. Do not use your personal computer or device to read about this topic. Use a safe computer, such as one at work, a friend's home, or a library.    Intimate partner violence--a type of domestic abuse--is different from an argument now and then. It is a pattern of abuse that one person may use to control another person's behavior. It may start with threats and name-calling. Then, it may lead to more serious acts, like pushing and slapping. The abuse also may occur in other areas. For example, the abuser may withhold money or spend a partner's money without their knowledge.  Abuse can cause serious harm. You are more likely to have a long-term health problem from the injuries and stress of living in a violent relationship. People who are sexually abused by their partners have more sexually transmitted infections and unplanned pregnancies. Anyone who is abused also faces emotional " "pain. Anyone can be abused in relationships. In some relationships, both people use abusive behavior.  If you are pregnant, abuse can cause problems such as poor weight gain, infections, and bleeding. Abuse during this time may increase your baby's risk of low birth weight, premature birth, and death.  Follow-up care is a key part of your treatment and safety. Be sure to make and go to all appointments, and call your doctor if you are having problems. It's also a good idea to know your test results and keep a list of the medicines you take.  How can you care for yourself at home?  If you do not have a safe place to stay, discuss this with your doctor before you leave.  Have a plan for where to go, how to leave your home, and where to stay in case of an emergency. Do not tell your partner about your plan. Contact:  The National Domestic Violence Hotline toll-free at 1-747.240.8650. They can help you find resources in your area.  Your local police department, hospital, or clinic for information about shelters and safe homes near you.  Talk to a trusted friend or neighbor, a counselor, or a gianfranco leader. Do not feel that you have to hide what happened.  Teach your children how to call for help in an emergency.  Be alert to warning signs, such as threats, heavy alcohol use, or drug use. This can help you avoid danger.  If you can, make sure that there are no guns or other weapons in your home.  When should you call for help?   Call 911 anytime you think you may need emergency care. For example, call if:    You or someone else has just been abused.     You think you or someone else is in danger of being abused.   Watch closely for changes in your health, and be sure to contact your doctor if you have any problems.  Where can you learn more?  Go to https://www.healthwise.net/patiented  Enter G282 in the search box to learn more about \"Intimate Partner Violence: Care Instructions.\"  Current as of: July 31, 2024  Content " Version: 14.3    2024 [employer].   Care instructions adapted under license by your healthcare professional. If you have questions about a medical condition or this instruction, always ask your healthcare professional. [employer] disclaims any warranty or liability for your use of this information.    Intimate Partner Violence Safety Instructions: Care Instructions  Overview     If you want to save this information but don't think it is safe to take it home, see if a trusted friend can keep it for you. Plan ahead. Know who you can call for help, and memorize the phone number.   Be careful online too. Your online activity may be seen by others. Do not use your personal computer or device to read about this topic. Use a safe computer, such as one at work, a friend's home, or a library.    When you are abused by a spouse or partner, you can take actions to protect yourself and your children.  You can increase your safety whether you decide to stay with your spouse or partner or you decide to leave. You may want to make a safety plan and pack a bag ahead of time. This will help you leave quickly when you decide to. Remember, you cannot change your partner's actions, but you can find help for you and your children. No one deserves to be abused.  Follow-up care is a key part of your treatment and safety. Be sure to make and go to all appointments, and call your doctor if you are having problems. It's also a good idea to know your test results and keep a list of the medicines you take.  How can you care for yourself at home?  Make a plan for your safety   If you decide to stay with your abusive spouse or partner, you can do the following to increase your safety:  Decide what works best to keep you safe in an emergency.  Know who you can call to help you in an emergency.  Decide if you will call the police if you get hurt again. If you can, agree on a signal with your children or neighbor to call the  police for you if you need help. You can flash lights or hang something out of a window.  Choose a safe place to go for a short time if you need to leave home. Memorize the address and phone number.  Learn escape routes out of your home in case you need to leave in a hurry. Teach your children different ways to get out of your home quickly if they need to.  If you can, hide or lock up things that can be used as weapons (guns, knives, hammers).  Learn the number of a domestic violence shelter. Talk to the people there about how they can help.  Find out about other community resources that can help you.  Take pictures of bruises or other injuries if you can. You can also take pictures of things your abuser has broken.  Teach your children that violence is never okay. Tell them that they do not deserve to be hurt.  Pack a bag   Prepare a bag with things you will need if you leave suddenly. Leave it with a friend, a relative, or someone else you trust. You could include the following items in the bag:  A set of keys to your home and car.  Emergency phone numbers and addresses.  Money such as cash or checks. You can also ask a friend, a relative, or someone else you trust to hold money for you.  Copies of legal documents such as house and car titles or rent receipts, birth certificates, Social Security card, voter registration, marriage and 's licenses, and your children's health records.  Personal items you would need for a few days, such as clothes, a toothbrush, toothpaste, and any medicines you or your children need.  A favorite toy or book for your child or children.  Diapers and bottles, if you have very young children.  Pictures that show signs of abuse and violence. You may also add pictures of your abuser.  If you leave   If you decide to leave, you can take the following steps:  Go to the emergency room at a hospital if you have been hurt.  Think about asking the police to be with you as you leave. They  "can protect you as you leave your home.  If you decide to leave secretly, remember that activities can be tracked. Your abuser may still have access to your cell phone, email, and credit cards. It may be possible for these to be traced. Always be aware of your surroundings.  If this is an emergency, do not worry about gathering up anything. Just leave--your safety is most important.  If your abuser moves out, change the locks on the doors. If you have a security system, change the access code.  When should you call for help?   Call 911 anytime you think you may need emergency care. For example, call if:    You or someone else has just been abused.     You think you or someone else is in danger of being abused.   Watch closely for changes in your health, and be sure to contact your doctor if you have any problems.  Where can you learn more?  Go to https://www.Exotel.net/patiented  Enter A752 in the search box to learn more about \"Intimate Partner Violence Safety Instructions: Care Instructions.\"  Current as of: July 31, 2024  Content Version: 14.3    2024 [employer].   Care instructions adapted under license by your healthcare professional. If you have questions about a medical condition or this instruction, always ask your healthcare professional. [employer] disclaims any warranty or liability for your use of this information.    Learning About Intimate Partner Violence  What is intimate partner violence?  Intimate partner violence is a type of domestic abuse. It's threatening, emotionally harmful, or violent behavior in a personal relationship. It can happen between past or current partners or spouses. In some relationships both people abuse each other. One partner may be more abusive. Or the abuse may be equal.  Abuse can affect people of any ethnic group, race, or Shinto. It can affect teens, adults, or the elderly. And it can happen to people of any sexual orientation, gender, or " social status.  Abusers use fear, bullying, and threats to control their partners. They may control what their partners do. They may control where their partners go or who they see. They may act jealous, controlling, or possessive. These early signs of abuse may happen soon after the start of the relationship. Sometimes it can be hard to notice abuse at first. But after the relationship becomes more serious, the abuse may get worse.  If you are being abused in your relationship, it's important to get help. The abuse is not your fault. You don't have to face it alone.  Be careful  It may not be safe to take home domestic abuse information like this handout. Some people ask a trusted friend to keep it for them. It's also important to plan ahead and to memorize the phone number of places you can go for help. If you are concerned about your safety, do not use your computer, smartphone, or tablet to read about domestic abuse.   What are the types of intimate partner violence?  Abuse can happen in different ways. Each type can happen on its own or in combination with others.  Emotional abuse  Emotional abuse is a pattern of threats, insults, or controlling behavior. It includes verbal abuse. It goes beyond healthy disagreements in a relationship. It's a sign of an unhealthy relationship.  Do you feel threatened, intimidated, or controlled?  Does your partner:  Threaten your children, other family members, or pets?  Use jokes meant to embarrass or shame you?  Call you names?  Tell you that you are a bad parent?  Threaten to take away your children?  Threaten to have you or your family members deported?  Control your access to money or other basic needs?  Control what you do, who you see or talk to, or where you go?  Another form of emotional abuse is denying that it is happening. Or the abuser may act like the abuse is no big deal or is your fault.  Sexual abuse  With sexual abuse, abusers may try to convince or force you  to have sex. They may force you into sex acts you're not comfortable with. Or they may sexually assault you. Sexual abuse can happen even if you are in a committed relationship.  Physical abuse  Physical abuse means that a partner hits, kicks, or does something else to physically hurt you. Physical abuse that starts with a slap might lead to kicking, shoving, and choking over time. The abuser may also threaten to hurt or kill you.  Stalking  Stalking means that an abuser gives you attention that you do not want and that causes you fear. Examples of stalking include:  Following you.  Showing up at places where the abuser isn't invited, such as at your work or school.  Constantly calling or texting you.  What problems can  to?  Intimate partner violence can be very dangerous. It can cause serious, repeated injury. It can even lead to death.  All forms of abuse can cause long-term health problems from the stress of a violent relationship. Verbal abuse can lead to sexual and physical abuse.  Abuse causes:  Emotional pain.  Depression.  Anxiety.  Post-traumatic stress.  Sexual abuse can lead to sexually transmitted infections (such as HIV/AIDS) and unplanned pregnancy.  Pregnancy can be a very dangerous time for people in abusive relationships. Abuse can cause or increase the risk of problems during pregnancy. These include low weight gain, anemia, infections, and bleeding. Abuse may also increase your baby's risk of low birth weight, premature birth, and death.  It can be hard for some victims of abuse to ask for help or to leave their relationship. You may feel scared, stuck, or not sure what steps to take. But it's important not to ignore abuse. Talking to someone you trust could be the first step to ending the abuse and taking care of your own health and happiness again. There are resources available that can help keep you safe.  Where can you get help?  Talk to a trusted friend. Find a local advocacy group,  "or talk to your doctor about the abuse.  Contact the National Domestic Violence Hotline at 8-061-011-SAFE (1-194.252.5111) for more safety tips. They can guide you to groups in your area that can help. Or go to the National Coalition Against Domestic Violence website at www.thehotline.org to learn more.  Domestic violence groups or a counselor in your area can help you make a safety plan for yourself and your children.  When to call for help  Call 911 anytime you think you may need emergency care. For example, call if:  You think that you or someone you know is in danger of being abused.  You have been hurt and can't have someone safely take you to emergency care.  You have just been abused.  A family member has just been abused.  Where can you learn more?  Go to https://www.Cotendo.Jalousier/patiented  Enter S665 in the search box to learn more about \"Learning About Intimate Partner Violence.\"  Current as of: July 31, 2024  Content Version: 14.3    2024 [employer].   Care instructions adapted under license by your healthcare professional. If you have questions about a medical condition or this instruction, always ask your healthcare professional. [employer] disclaims any warranty or liability for your use of this information.    Vaginal Bleeding During Pregnancy: Care Instructions  Overview     It's common to have some vaginal spotting when you are pregnant. In some cases, the bleeding isn't serious. And there aren't any more problems with the pregnancy.  But sometimes bleeding is a sign of a more serious problem. This is more common if the bleeding is heavy or painful. Examples of more serious problems include miscarriage, an ectopic pregnancy, and a problem with the placenta.  You may have to see your doctor again to be sure everything is okay. You may also need more tests to find the cause of the bleeding.  Home treatment may be all you need. But it depends on what is causing the bleeding. " Be sure to tell your doctor if you have any new symptoms or if your symptoms get worse.  The doctor has checked you carefully, but problems can develop later. If you notice any problems or new symptoms, get medical treatment right away.  Follow-up care is a key part of your treatment and safety. Be sure to make and go to all appointments, and call your doctor if you are having problems. It's also a good idea to know your test results and keep a list of the medicines you take.  How can you care for yourself at home?  If your doctor prescribed medicines, take them exactly as directed. Call your doctor if you think you are having a problem with your medicine.  Do not have vaginal sex until your doctor says it's okay.  Do not put anything in your vagina until your doctor says it's okay.  Ask your doctor about other activities you can or can't do.  Get a lot of rest. Being pregnant can make you tired.  Do not use nonsteroidal anti-inflammatory drugs (NSAIDs), such as ibuprofen (Advil, Motrin), naproxen (Aleve), or aspirin, unless your doctor says it is okay.  When should you call for help?   Call 911 anytime you think you may need emergency care. For example, call if:    You passed out (lost consciousness).     You have severe vaginal bleeding. This means you are soaking through a pad each hour for 2 or more hours.     You have sudden, severe pain in your belly or pelvis.   Call your doctor now or seek immediate medical care if:    You have new or worse vaginal bleeding.     You are dizzy or lightheaded, or you feel like you may faint.     You have pain in your belly, pelvis, or lower back.     You think that you are in labor.     You have a sudden release of fluid from your vagina.     You've been having regular contractions for an hour. This means that you've had at least 8 contractions within 1 hour or at least 4 contractions within 20 minutes, even after you change your position and drink fluids.     You notice that  your baby has stopped moving or is moving much less than normal.   Watch closely for changes in your health, and be sure to contact your doctor if you have any problems.  Current as of: April 30, 2024  Content Version: 14.3    2024 [employer].   Care instructions adapted under license by your healthcare professional. If you have questions about a medical condition or this instruction, always ask your healthcare professional. [employer] disclaims any warranty or liability for your use of this information.  Weeks 10 to 14 of Your Pregnancy: Care Instructions  It's now possible to hear the fetus's heartbeat with a special ultrasound device. And the fetus's organs are developing.    Decide about tests to check for birth defects. Think about your age, your chance of passing on a family disease, your need to know about any problems, and what you might do after you have the test results.   It's okay to exercise. Try activities such as walking or swimming. Check with your doctor before starting a new program.     You may feel more tired than usual.  Taking naps during the day may help.     You may feel emotional.  It might help to talk to someone.     You may have headaches.  Try lying down and putting a cool cloth over your forehead.     You can use acetaminophen (Tylenol) for pain relief.  Don't take any anti-inflammatory medicines (such as Advil, Motrin, Aleve), unless your doctor says it's okay.     You may feel a fullness or aching in your lower belly.  This can feel like the kind of cramps you might get before a period. A back rub may help.     You may need to urinate more.  Your growing uterus and changing hormones can affect your bladder.     You may feel sick to your stomach (morning sickness).  Try avoiding food and smells that make you feel sick.     Your breasts may feel different.  They may feel tender or get bigger. Your nipples may get darker. Try a bra that gives you good support.      "Avoid alcohol, tobacco, and drugs (including marijuana).  If you need help quitting, talk to your doctor.     Take a daily prenatal vitamin.  Choose one with folic acid.   Follow-up care is a key part of your treatment and safety. Be sure to make and go to all appointments, and call your doctor if you are having problems. It's also a good idea to know your test results and keep a list of the medicines you take.  Where can you learn more?  Go to https://www.Snipshot.net/patiented  Enter E090 in the search box to learn more about \"Weeks 10 to 14 of Your Pregnancy: Care Instructions.\"  Current as of: April 30, 2024  Content Version: 14.3    2024 [employer].   Care instructions adapted under license by your healthcare professional. If you have questions about a medical condition or this instruction, always ask your healthcare professional. [employer] disclaims any warranty or liability for your use of this information.      Nutrition During Pregnancy: Care Instructions  Overview     Healthy eating when you are pregnant is important for you and your baby. It can help you feel well and have a successful pregnancy and delivery. During pregnancy your nutrition needs increase. Even if you have excellent eating habits, your doctor may recommend a multivitamin to make sure you get enough iron and folic acid.  You may wonder how much weight you should gain. In general, if you were at a healthy weight before you became pregnant, then you should gain between 25 and 35 pounds. If you were overweight before pregnancy, then you'll likely be advised to gain 15 to 25 pounds. If you were underweight before pregnancy, then you'll probably be advised to gain 28 to 40 pounds. Your doctor will work with you to set a weight goal that is right for you. Gaining a healthy amount of weight helps you have a healthy baby.  Follow-up care is a key part of your treatment and safety. Be sure to make and go to all " appointments, and call your doctor if you are having problems. It's also a good idea to know your test results and keep a list of the medicines you take.  How can you care for yourself at home?  Eat plenty of fruits and vegetables. Include a variety of orange, yellow, and leafy dark-green vegetables every day.  Choose whole-grain bread, cereal, and pasta. Good choices include whole wheat bread, whole wheat pasta, brown rice, and oatmeal.  Get 4 or more servings of milk and milk products each day. Good choices include nonfat or low-fat milk, yogurt, and cheese. If you cannot eat milk products, you can get calcium from calcium-fortified products such as orange juice, soy milk, and tofu. Other non-milk sources of calcium include leafy green vegetables, such as broccoli, kale, mustard greens, turnip greens, bok tiera, and brussels sprouts.  If you eat meat, pick lower-fat types. Good choices include lean cuts of meat and chicken or turkey without the skin.  Avoid fish that are high in mercury. These include shark, swordfish, shilpi mackerel, marlin, orange roughy, and bigeye tuna, as well as tilefish from the [facility].  It's okay to eat up to 8 to 12 ounces a week of fish that are low in mercury or up to 4 ounces a week of fish that have medium levels of mercury. Some fish that are low in mercury are salmon, shrimp, canned light tuna, cod, and tilapia. Some fish that have medium levels of mercury are halibut and white albacore tuna.  For more advice about eating fish, you can visit the U.S. Food and Drug Administration (FDA) or U.S. Environmental Protection Agency (EPA) website.  Heat lunch meats (such as turkey, ham, or bologna) to 165 F before you eat them. This reduces your risk of getting sick from a kind of bacteria that can be found in lunch meats.  Do not eat unpasteurized soft cheeses, such as brie, feta, fresh mozzarella, and blue cheese. They have a bacteria that could harm your baby.  Limit caffeine to  "about 200 to 300 mg per day. On average, a cup of brewed coffee has around 80 to 100 mg of caffeine.  Do not drink any alcohol. No amount of alcohol has been found to be safe during pregnancy.  Do not diet or try to lose weight. For example, do not follow a low-carbohydrate diet. If you are overweight at the start of your pregnancy, your doctor will work with you to manage your weight gain.  Tell your doctor about all vitamins and supplements you take.  When should you call for help?  Watch closely for changes in your health, and be sure to contact your doctor if you have any problems.  Where can you learn more?  Go to https://www.YesWeAd.net/patiented  Enter Y785 in the search box to learn more about \"Nutrition During Pregnancy: Care Instructions.\"  Current as of: September 20, 2023  Content Version: 14.3    2024 [employer].   Care instructions adapted under license by your healthcare professional. If you have questions about a medical condition or this instruction, always ask your healthcare professional. [employer] disclaims any warranty or liability for your use of this information.    Exercise During Pregnancy: Care Instructions  Overview     Exercise is good for you during a healthy pregnancy. It can relieve back pain, swelling, and other discomforts. It also prepares your muscles for childbirth. And exercise can improve your energy level and help you sleep better.  If your doctor advises it, get more exercise. For example, walking is a good choice. Bit by bit, increase the amount you walk every day. Try for at least 30 minutes on most days of the week. You could also try a prenatal exercise class. But if you do not already exercise, be sure to talk with your doctor before you start a new exercise program. Doctors do not recommend contact sports during pregnancy.  Follow-up care is a key part of your treatment and safety. Be sure to make and go to all appointments, and call your " "doctor if you are having problems. It's also a good idea to know your test results and keep a list of the medicines you take.  How can you care for yourself at home?  Talk with your doctor about the right kind of exercise for each stage of pregnancy.  Listen to your body to know if your exercise is at a safe level.  Do not become overheated while you exercise. High body temperature can be harmful. Avoid activities that can make your body too hot.  If you feel tired, take it easy. You might walk instead of run.  If you are used to strenuous exercise, ask your doctor how to know when it's time to slow down.  If you exercised before getting pregnant, you should be able to keep up your routine early in your pregnancy. Later in your pregnancy, you may want to switch to more gentle activities.  Drink plenty of fluids before, during, and after exercise.  Avoid contact sports, such as soccer and basketball. Also avoid risky activities. These include scuba diving, horseback riding, and exercising at a high altitude (above 6,000 feet). If you live in a place with a high altitude, talk to your doctor about how you can exercise safely.  Do not get overtired while you exercise. You should be able to talk while you work out.  After your fourth month of pregnancy, avoid exercises that require you to lie flat on your back on a hard surface. These include sit-ups and some yoga poses.  Get plenty of rest. You may be very tired while you are pregnant.  Where can you learn more?  Go to https://www.GT Solar.net/patiented  Enter S801 in the search box to learn more about \"Exercise During Pregnancy: Care Instructions.\"  Current as of: April 30, 2024  Content Version: 14.3    2024 [employer].   Care instructions adapted under license by your healthcare professional. If you have questions about a medical condition or this instruction, always ask your healthcare professional. [employer] disclaims any warranty or " liability for your use of this information.    Learning About Pregnancy When You Are Underweight or Overweight  How does your weight affect your pregnancy?    The basics of prenatal care are the same for everyone, regardless of size. You'll get what you need to have a healthy baby.  But if you are not at a weight that is healthy for you, it can make a difference in a few things. Being underweight or overweight can increase the chances of some problems during pregnancy. So your doctor or midwife will pay close attention to your health and your baby's health. You may have some extra doctor or midwife visits and tests. And you may have some tests earlier in your pregnancy.  Work with your doctor or midwife to get the care you need. Go to all your doctor or midwife visits, and follow their advice about what to do and what to avoid during pregnancy.  How much weight gain is healthy during pregnancy?  There's no fixed number of pounds that you should be aiming for. Instead, there's a range of weight gain that's good for you and your baby. Based on your weight before pregnancy, experts say it's generally best to gain about:  28 lb (13 kg) to 40 lb (18 kg) if you're underweight.  25 lb (11 kg) to 35 lb (16 kg) if you're at a healthy weight.  15 lb (7 kg) to 25 lb (11 kg) if you're overweight.  11 lb (5 kg) to 20 lb (9 kg) if you're very overweight (obese). In some cases, a doctor may recommend that you don't gain any weight.  If you have questions about weight gain during pregnancy, talk with your doctor about what's right for you. Gaining a healthy amount of weight helps you have a healthy pregnancy.  How much extra food do you need to eat?  How much food you need to eat during pregnancy depends on:  Your height.  How much you weigh when you get pregnant.  How active you are.  If you're carrying more than one fetus (multiple pregnancy).  In the first trimester, you'll probably need the same amount of calories as you did  "before you were pregnant. In general, in your second trimester, you need to eat about 340 extra calories a day. In your third trimester, you need to eat about 450 extra calories a day.  What can you do to have a healthy pregnancy?  The best things you can do for you and your baby are to eat healthy foods, get regular exercise, avoid alcohol and smoking, and go to your doctor or midwife visits.  Eat a variety of foods from all the food groups. Make sure to get enough calcium and folic acid. Ask your doctor or midwife how much folic acid you should be taking.  You may want to work with a dietitian to help you plan healthy meals to get the right amount of calories for you.  Talk to your doctor or midwife about how you can exercise safely. If you didn't exercise much before you got pregnant, talk to your doctor or midwife about how you can slowly get more active. They may want to set up an exercise program with you.  Where can you learn more?  Go to https://www.[employer].net/patiented  Enter B644 in the search box to learn more about \"Learning About Pregnancy When You Are Underweight or Overweight.\"  Current as of: April 30, 2024  Content Version: 14.3    2024 [employer].   Care instructions adapted under license by your healthcare professional. If you have questions about a medical condition or this instruction, always ask your healthcare professional. [employer] disclaims any warranty or liability for your use of this information.    You have been provided the CDC Warning Signs in Pregnancy document.    Additional copies can be found here: www.[employer].com/138672.pdf  "

## 2025-01-23 ENCOUNTER — TRANSCRIBE ORDERS (OUTPATIENT)
Dept: MATERNAL FETAL MEDICINE | Facility: CLINIC | Age: 40
End: 2025-01-23

## 2025-01-23 ENCOUNTER — PRENATAL OFFICE VISIT (OUTPATIENT)
Dept: OBGYN | Facility: CLINIC | Age: 40
End: 2025-01-23
Attending: OBSTETRICS & GYNECOLOGY
Payer: COMMERCIAL

## 2025-01-23 VITALS
DIASTOLIC BLOOD PRESSURE: 63 MMHG | OXYGEN SATURATION: 100 % | HEART RATE: 88 BPM | BODY MASS INDEX: 21.96 KG/M2 | SYSTOLIC BLOOD PRESSURE: 134 MMHG | WEIGHT: 161.9 LBS

## 2025-01-23 DIAGNOSIS — O21.9 NAUSEA AND VOMITING DURING PREGNANCY: ICD-10-CM

## 2025-01-23 DIAGNOSIS — O09.522 AMA (ADVANCED MATERNAL AGE) MULTIGRAVIDA 35+, SECOND TRIMESTER: Primary | ICD-10-CM

## 2025-01-23 DIAGNOSIS — O26.90 PREGNANCY RELATED CONDITION, ANTEPARTUM: Primary | ICD-10-CM

## 2025-01-23 DIAGNOSIS — Z82.79 FAMILY HISTORY OF DOWN SYNDROME: ICD-10-CM

## 2025-01-23 DIAGNOSIS — Z34.80 PRENATAL CARE, SUBSEQUENT PREGNANCY, UNSPECIFIED TRIMESTER: ICD-10-CM

## 2025-01-23 DIAGNOSIS — Z11.3 SCREEN FOR STD (SEXUALLY TRANSMITTED DISEASE): ICD-10-CM

## 2025-01-23 DIAGNOSIS — Z12.4 PAP SMEAR FOR CERVICAL CANCER SCREENING: ICD-10-CM

## 2025-01-23 LAB
ALBUMIN UR-MCNC: NEGATIVE MG/DL
APPEARANCE UR: CLEAR
BILIRUB UR QL STRIP: NEGATIVE
C TRACH DNA SPEC QL NAA+PROBE: NEGATIVE
COLOR UR AUTO: NORMAL
ERYTHROCYTE [DISTWIDTH] IN BLOOD BY AUTOMATED COUNT: 29.5 % (ref 10–15)
EST. AVERAGE GLUCOSE BLD GHB EST-MCNC: 108 MG/DL
GLUCOSE UR STRIP-MCNC: NEGATIVE MG/DL
GROUP B STREPTOCOCCUS (EXTERNAL): POSITIVE
HBA1C MFR BLD: 5.4 % (ref 0–5.6)
HBV SURFACE AG SERPL QL IA: NONREACTIVE
HCT VFR BLD AUTO: 30.8 % (ref 35–47)
HCV AB SERPL QL IA: NONREACTIVE
HGB BLD-MCNC: 9.2 G/DL (ref 11.7–15.7)
HGB UR QL STRIP: NEGATIVE
HIV 1+2 AB+HIV1 P24 AG SERPL QL IA: NONREACTIVE
KETONES UR STRIP-MCNC: NEGATIVE MG/DL
LEUKOCYTE ESTERASE UR QL STRIP: NEGATIVE
MCH RBC QN AUTO: 21.7 PG (ref 26.5–33)
MCHC RBC AUTO-ENTMCNC: 29.9 G/DL (ref 31.5–36.5)
MCV RBC AUTO: 73 FL (ref 78–100)
N GONORRHOEA DNA SPEC QL NAA+PROBE: NEGATIVE
NITRATE UR QL: NEGATIVE
PH UR STRIP: 7 [PH] (ref 5–7)
PLATELET # BLD AUTO: 240 10E3/UL (ref 150–450)
RBC # BLD AUTO: 4.24 10E6/UL (ref 3.8–5.2)
SP GR UR STRIP: 1.01 (ref 1–1.03)
SPECIMEN TYPE: NORMAL
SPECIMEN TYPE: NORMAL
UROBILINOGEN UR STRIP-MCNC: NORMAL MG/DL
WBC # BLD AUTO: 7 10E3/UL (ref 4–11)

## 2025-01-23 RX ORDER — METOCLOPRAMIDE 10 MG/1
10 TABLET ORAL 4 TIMES DAILY PRN
Qty: 40 TABLET | Refills: 1 | Status: SHIPPED | OUTPATIENT
Start: 2025-01-23

## 2025-01-23 NOTE — PATIENT INSTRUCTIONS
Weeks 10 to 14 of Your Pregnancy: Care Instructions  It's now possible to hear the fetus's heartbeat with a special ultrasound device. And the fetus's organs are developing.    Decide about tests to check for birth defects. Think about your age, your chance of passing on a family disease, your need to know about any problems, and what you might do after you have the test results.   It's okay to exercise. Try activities such as walking or swimming. Check with your doctor before starting a new program.     You may feel more tired than usual.  Taking naps during the day may help.     You may feel emotional.  It might help to talk to someone.     You may have headaches.  Try lying down and putting a cool cloth over your forehead.     You can use acetaminophen (Tylenol) for pain relief.  Don't take any anti-inflammatory medicines (such as Advil, Motrin, Aleve), unless your doctor says it's okay.     You may feel a fullness or aching in your lower belly.  This can feel like the kind of cramps you might get before a period. A back rub may help.     You may need to urinate more.  Your growing uterus and changing hormones can affect your bladder.     You may feel sick to your stomach (morning sickness).  Try avoiding food and smells that make you feel sick.     Your breasts may feel different.  They may feel tender or get bigger. Your nipples may get darker. Try a bra that gives you good support.     Avoid alcohol, tobacco, and drugs (including marijuana).  If you need help quitting, talk to your doctor.     Take a daily prenatal vitamin.  Choose one with folic acid.   Follow-up care is a key part of your treatment and safety. Be sure to make and go to all appointments, and call your doctor if you are having problems. It's also a good idea to know your test results and keep a list of the medicines you take.  Where can you learn more?  Go to https://www.healthwise.net/patiented  Enter E090 in the search box to learn more  "about \"Weeks 10 to 14 of Your Pregnancy: Care Instructions.\"  Current as of: April 30, 2024  Content Version: 14.3    2024 [employer].   Care instructions adapted under license by your healthcare professional. If you have questions about a medical condition or this instruction, always ask your healthcare professional. [employer] disclaims any warranty or liability for your use of this information.    Nutrition During Pregnancy: Care Instructions  Overview     Healthy eating when you are pregnant is important for you and your baby. It can help you feel well and have a successful pregnancy and delivery. During pregnancy your nutrition needs increase. Even if you have excellent eating habits, your doctor may recommend a multivitamin to make sure you get enough iron and folic acid.  You may wonder how much weight you should gain. In general, if you were at a healthy weight before you became pregnant, then you should gain between 25 and 35 pounds. If you were overweight before pregnancy, then you'll likely be advised to gain 15 to 25 pounds. If you were underweight before pregnancy, then you'll probably be advised to gain 28 to 40 pounds. Your doctor will work with you to set a weight goal that is right for you. Gaining a healthy amount of weight helps you have a healthy baby.  Follow-up care is a key part of your treatment and safety. Be sure to make and go to all appointments, and call your doctor if you are having problems. It's also a good idea to know your test results and keep a list of the medicines you take.  How can you care for yourself at home?  Eat plenty of fruits and vegetables. Include a variety of orange, yellow, and leafy dark-green vegetables every day.  Choose whole-grain bread, cereal, and pasta. Good choices include whole wheat bread, whole wheat pasta, brown rice, and oatmeal.  Get 4 or more servings of milk and milk products each day. Good choices include nonfat or low-fat " milk, yogurt, and cheese. If you cannot eat milk products, you can get calcium from calcium-fortified products such as orange juice, soy milk, and tofu. Other non-milk sources of calcium include leafy green vegetables, such as broccoli, kale, mustard greens, turnip greens, bok tiera, and brussels sprouts.  If you eat meat, pick lower-fat types. Good choices include lean cuts of meat and chicken or turkey without the skin.  Avoid fish that are high in mercury. These include shark, swordfish, shipli mackerel, marlin, orange roughy, and bigeye tuna, as well as tilefish from the [facility].  It's okay to eat up to 8 to 12 ounces a week of fish that are low in mercury or up to 4 ounces a week of fish that have medium levels of mercury. Some fish that are low in mercury are salmon, shrimp, canned light tuna, cod, and tilapia. Some fish that have medium levels of mercury are halibut and white albacore tuna.  For more advice about eating fish, you can visit the U.S. Food and Drug Administration (FDA) or U.S. Environmental Protection Agency (EPA) website.  Heat lunch meats (such as turkey, ham, or bologna) to 165 F before you eat them. This reduces your risk of getting sick from a kind of bacteria that can be found in lunch meats.  Do not eat unpasteurized soft cheeses, such as brie, feta, fresh mozzarella, and blue cheese. They have a bacteria that could harm your baby.  Limit caffeine to about 200 to 300 mg per day. On average, a cup of brewed coffee has around 80 to 100 mg of caffeine.  Do not drink any alcohol. No amount of alcohol has been found to be safe during pregnancy.  Do not diet or try to lose weight. For example, do not follow a low-carbohydrate diet. If you are overweight at the start of your pregnancy, your doctor will work with you to manage your weight gain.  Tell your doctor about all vitamins and supplements you take.  When should you call for help?  Watch closely for changes in your health, and be sure  "to contact your doctor if you have any problems.  Where can you learn more?  Go to https://www.[employer].net/patiented  Enter Y785 in the search box to learn more about \"Nutrition During Pregnancy: Care Instructions.\"  Current as of: September 20, 2023  Content Version: 14.3    2024 [employer].   Care instructions adapted under license by your healthcare professional. If you have questions about a medical condition or this instruction, always ask your healthcare professional. [employer] disclaims any warranty or liability for your use of this information.    Exercise During Pregnancy: Care Instructions  Overview     Exercise is good for you during a healthy pregnancy. It can relieve back pain, swelling, and other discomforts. It also prepares your muscles for childbirth. And exercise can improve your energy level and help you sleep better.  If your doctor advises it, get more exercise. For example, walking is a good choice. Bit by bit, increase the amount you walk every day. Try for at least 30 minutes on most days of the week. You could also try a prenatal exercise class. But if you do not already exercise, be sure to talk with your doctor before you start a new exercise program. Doctors do not recommend contact sports during pregnancy.  Follow-up care is a key part of your treatment and safety. Be sure to make and go to all appointments, and call your doctor if you are having problems. It's also a good idea to know your test results and keep a list of the medicines you take.  How can you care for yourself at home?  Talk with your doctor about the right kind of exercise for each stage of pregnancy.  Listen to your body to know if your exercise is at a safe level.  Do not become overheated while you exercise. High body temperature can be harmful. Avoid activities that can make your body too hot.  If you feel tired, take it easy. You might walk instead of run.  If you are used to strenuous " "exercise, ask your doctor how to know when it's time to slow down.  If you exercised before getting pregnant, you should be able to keep up your routine early in your pregnancy. Later in your pregnancy, you may want to switch to more gentle activities.  Drink plenty of fluids before, during, and after exercise.  Avoid contact sports, such as soccer and basketball. Also avoid risky activities. These include scuba diving, horseback riding, and exercising at a high altitude (above 6,000 feet). If you live in a place with a high altitude, talk to your doctor about how you can exercise safely.  Do not get overtired while you exercise. You should be able to talk while you work out.  After your fourth month of pregnancy, avoid exercises that require you to lie flat on your back on a hard surface. These include sit-ups and some yoga poses.  Get plenty of rest. You may be very tired while you are pregnant.  Where can you learn more?  Go to https://www.[employer].net/patiented  Enter S801 in the search box to learn more about \"Exercise During Pregnancy: Care Instructions.\"  Current as of: April 30, 2024  Content Version: 14.3    2024 [employer].   Care instructions adapted under license by your healthcare professional. If you have questions about a medical condition or this instruction, always ask your healthcare professional. [employer] disclaims any warranty or liability for your use of this information.    "

## 2025-01-23 NOTE — PROGRESS NOTES
INITIAL OB ASSESSMENT............................................Date: 2025                            ---------------------    Name: Homero Landry.......................................................................Plan Number: [id number]    Age: 39 year old   : 1985  Phone: [phone] (home)   Address: [address] [place] [address]    Marital Status:  co-habitating  Race/Ethnicity:   Occupation:  /self employed.   Partner's Name:  Aidan Sal, Partner's Occupation:  attendant.        LMP:  Patient's LMP from OB Dating Form:  Patient's last menstrual period was 10/17/2024 (exact date).  Regular menses? no  Length of menses: 7 days    US OB LESS THAN 14 WEEKS WITH TRANSVAGINAL SINGLE 2024 3:21 PM     HISTORY: Left lower abdominal pain, spotting.  COMPARISON: None.  FINDINGS: Intrauterine cystic structure with apparent yolk sac. Fetal pole cannot be seen. Gestational age by mean sac size is 5 weeks 3 days. Viability cannot be established. Subchorionic hemorrhage is 1.1 x 1.2 x 0.5 cm.  Maternal adnexa: Left ovarian corpus luteum. Right ovary appears unremarkable.  Patient reported LMP: 10/19/2024  LMP gestational age: 5 weeks 6 days                                IMPRESSION: Intrauterine gestational sac with a yolk sac identified, but no fetal pole can be seen. Viability cannot be established.  Gestational age is approximately 5 weeks 3 days. Subchorionic hemorrhage identified. Suggest serial beta hCG assessment and repeat imaging as needed.       US OB < 14 WEEKS SINGLE-TRANSABDOMINAL 12/10/2024 2:05 PM  CLINICAL HISTORY: Check viability; Pregnancy test positive; Threatened .  TECHNIQUE: Transabdominal scans were performed. Endovaginal ultrasound was performed to better visualize the embryo.  COMPARISON: 2024.  FINDINGS:  UTERUS: Single normal appearing intrauterine gestation sac.  CRL: measures 11 mm,  equals 7 weeks 2 days.  FETAL HEART RATE: 144 bpm.  AMNIOTIC FLUID: Normal.  PLACENTA: Not yet formed. A small hypoechoic collection is seen adjacent to the gestational sac measuring 12 x 9 x 3 mm.  RIGHT OVARY: Normal.  LEFT OVARY: Corpus luteum.                                             IMPRESSION:   1.  Single living intrauterine gestation at 7 weeks 2 days, EDC 2025.  2.  Small subchronic hemorrhage.    US OB < 14 Weeks Single Transabdominal  Order: [id number]  Narrative  Indication:  Pain.  Technique:  Ultrasound examination of the gravid uterus was performed. The study was performed transabdominally and transvaginally. Grayscale and Doppler imaging was provided.  Comparison:  2024  Findings:  Gestational sac is noted averaging 4.3 centimeters which corresponds to 10 weeks and 0 days. The border of the sac is normal. A normal yolk sac is identified.  An embryo is identified measuring 3.7 centimeters which corresponds to 10 weeks and 5 days. Fetal heart rate is 204 beats per minute.  A small subchorionic hemorrhage is noted measuring 1.4 x 3.5 x 0.6 centimeters. No adnexal mass. No myometrial mass. No free fluid in the cul-de-sac.  Estimated date of delivery based on the crown-rump length measurement of 2025  Impression:  1. There is a single living intrauterine pregnancy at 10 weeks and 5 days with an estimated date of delivery of 2025.  2. There is a small subchorionic hemorrhage measuring 1.4 x 3.5 x 0.6 centimeters.  3. Fetal heart rate is 204 beats per minute this is considered elevated. At this age, the heart rate is usually in the range 170 beats per minute. The significance of this is uncertain. Consider short-term follow-up evaluation to reassess.  4. No myometrial or adnexal mass      Obstetrical History  ===================  OB History    Para Term  AB Living   5 2 2 0 2 2   SAB IAB Ectopic Multiple Live Births   1 0 0 0 2      # Outcome Date GA Lbr Cristino/2nd Weight  Sex Type Anes PTL Lv   5 Current            4 AB 02/01/21           3 Term 10/06/16    F Vag-Spont   CHELSIE      Complications: Preeclampsia/Hypertension   2 Term 09/19/12    M Vag-Spont  N CHELSIE      Birth Comments: System Generated. Please review and update pregnancy details.      Complications: Preeclampsia/Hypertension   1 SAB      SAB          Past Medical History:  Past Medical History:   Diagnosis Date    Anemia     Family history of Downs syndrome     niece    Sickle cell trait        Past Surgical History:  Past Surgical History:   Procedure Laterality Date    NO HISTORY OF SURGERY         Current Outpatient Medications   Medication Sig Dispense Refill    ondansetron (ZOFRAN ODT) 4 MG ODT tab Place 2 tablets (8 mg) under the tongue every 6 hours as needed for nausea. 20 tablet 0    Prenatal Vit-Fe Fumarate-FA (PRENATAL MULTIVITAMIN W/IRON) 27-0.8 MG tablet Take 1 tablet by mouth daily. 90 tablet 3       No Known Allergies    Social History     Socioeconomic History    Marital status: Single     Spouse name: Not on file    Number of children: 2    Years of education: Not on file    Highest education level: Not on file   Occupational History    Not on file   Tobacco Use    Smoking status: Every Day     Types: Cigarettes     Passive exposure: Current    Smokeless tobacco: Never   Vaping Use    Vaping status: Never Used   Substance and Sexual Activity    Alcohol use: Not Currently    Drug use: Not Currently     Types: Marijuana    Sexual activity: Yes     Partners: Male     Birth control/protection: None   Other Topics Concern    Not on file   Social History Narrative    Not on file     Social Drivers of Health     Financial Resource Strain: Low Risk  (11/28/2023)    Financial Resource Strain     Within the past 12 months, have you or your family members you live with been unable to get utilities (heat, electricity) when it was really needed?: No   Food Insecurity: Low Risk  (11/28/2023)    Food Insecurity      Within the past 12 months, did you worry that your food would run out before you got money to buy more?: No     Within the past 12 months, did the food you bought just not last and you didn t have money to get more?: No   Recent Concern: Food Insecurity - High Risk (10/24/2023)    Food Insecurity     Within the past 12 months, did you worry that your food would run out before you got money to buy more?: Yes     Within the past 12 months, did the food you bought just not last and you didn t have money to get more?: Yes   Transportation Needs: Low Risk  (11/28/2023)    Transportation Needs     Within the past 12 months, has lack of transportation kept you from medical appointments, getting your medicines, non-medical meetings or appointments, work, or from getting things that you need?: No   Physical Activity: Not on file   Stress: Not on file   Social Connections: Not on file   Interpersonal Safety: Low Risk  (1/14/2025)    Interpersonal Safety     Do you feel physically and emotionally safe where you currently live?: Yes     Within the past 12 months, have you been hit, slapped, kicked or otherwise physically hurt by someone?: No     Within the past 12 months, have you been humiliated or emotionally abused in other ways by your partner or ex-partner?: No   Housing Stability: High Risk (11/28/2023)    Housing Stability     Do you have housing? : Yes     Are you worried about losing your housing?: Yes     Single, Children, Significant Other  No substance abuse, environmental exposures, mental health risks and No financial concerns,pets. Partner support.       Family History   Problem Relation Age of Onset    Hypertension Mother     Cancer Mother     Kidney failure Mother     No Known Problems Father     Cancer Son        Past Medical History of Father of Baby:   No significant medical history     No known genetic disease in patient's 1st or 2nd degree relatives  No known genetic diseases in the FOB's 1st or 2nd degree  relatives    REVIEW OF SYMPTOMS:   History Since Last Menstrual Period:    nausea, vomiting, fatigue, and breast tenderness     PHYSICAL EXAM:  /63 (BP Location: Right arm, Cuff Size: Adult Regular)   Pulse 88   Wt 73.4 kg (161 lb 14.4 oz)   LMP 10/17/2024 (Exact Date)   SpO2 100%   BMI 21.96 kg/m    General:  WNWD female, NAD  Oriented:  X 3  Alert  PSYCH:  mentation appears normal., affect and mood normal  HEENT:  NC/AT, EOMI  NECK:  Neck supple. No adenopathy. Thyroid symmetric, normal size,, Carotids without bruits.  HEART:  RRR  LUNGS:  Clear to auscultation.  Good respiratory effort  BREASTS: Not performed today   ABDOMEN: Benign, Soft, flat, non-tender, No masses, organomegaly, and Bowel sounds normoactive FHT's heard   VULVA: no masses or lesions seen  BUS:  Bartholin's, Urethra, [place]'s normal  VAGINA:  No masses or lesions seen.   CERVIX:  Parous, closed, mobile, no discharge  UTERUS:  Normal shape, position and consistency and Nontender; 12-14 week size  ADNEXA:  No masses palpated, non-tender  EXTREMITIES:No cyanosis, clubbing, warm and well perfused and No edema   GAIT: normal including tandem walk, heel and toe walk.        Assessment:   IUP at 14 weeks   AMA  Family history of Down's  Sickle Cell Trait (FOB states he does not have Sickle cell trait or disease)       Plan:  Options for  testing for chromosomal and birth defects were discussed with the patient.  Diagnostic tests include CVS and Amniocentesis.  We discussed that these tests are definitive but invasive and do carry a risk of fetal loss.    Screening tests include nuchal translucency/blood marker testing in the first trimester and quad screening in the second trimester.  We discussed that these are screening tests and not diagnostic tests and that false positives and negatives are a distinct possibility.  She desires to have the NIPT testing done as a screen.    With the AMA and the family history of Down's syndrome,  she is interested in the [facility] consultation.    We discussed physician coverage, tertiary support, diet, exercise, weight gain, schedule of visits, routine and indicated ultrasounds, and childbirth education.   We discussed aspirin use in pregnancy.  Low-dose aspirin prophylaxis can be beneficial in women at high risk of developing preeclampsia.  I generally recommend we begin aspirin at about 12-13 weeks gestation and continue until at least 36 weeks.  Women with at least one of the following conditions are considered high risk for developing preeclampsia: Previous pregnancy with preeclampsia,  multifetal-gestation, chronic hypertension, diabetes mellitus, chronic kidney disease, autoimmune disease.  Women with more than 1 of the following conditions may also consider low-dose aspirin prophylaxis in pregnancy: Nulliparity, BMI greater than 30, family history of preeclampsia (mother or sister), AMA, socio-demographic characteristics, personal risk factors.    Labs done today  RTC 4 weeks

## 2025-01-25 LAB
HPV HR 12 DNA CVX QL NAA+PROBE: NEGATIVE
HPV16 DNA CVX QL NAA+PROBE: NEGATIVE
HPV18 DNA CVX QL NAA+PROBE: NEGATIVE
HUMAN PAPILLOMA VIRUS FINAL DIAGNOSIS: NORMAL

## 2025-01-27 ENCOUNTER — MYC MEDICAL ADVICE (OUTPATIENT)
Dept: OBGYN | Facility: CLINIC | Age: 40
End: 2025-01-27
Payer: COMMERCIAL

## 2025-01-27 DIAGNOSIS — Z32.01 PREGNANCY TEST POSITIVE: ICD-10-CM

## 2025-01-27 RX ORDER — ONDANSETRON 4 MG/1
8 TABLET, ORALLY DISINTEGRATING ORAL EVERY 6 HOURS PRN
Qty: 20 TABLET | Refills: 0 | Status: SHIPPED | OUTPATIENT
Start: 2025-01-27

## 2025-01-27 NOTE — TELEPHONE ENCOUNTER
Sage received from patient asking if she can go back to using Zofran.     14w4d      2025 Reglan 10 mg tabs prescribed for nausea/vomiting during pregnancy    Previously prescribed Zofran 4 mg tabs   With sig: Sig - Route: Place 2 tablets (8 mg) under the tongue every 6 hours as needed for nausea.     Patient reports Reglan is making her feel worse and would like to go back to using Zofran.     Routing request to provider.     Marycarmen ENAMORADO RN -  [facility] group

## 2025-01-30 LAB
BKR AP ASSOCIATED HPV REPORT: ABNORMAL
BKR LAB AP GYN ADEQUACY: ABNORMAL
BKR LAB AP GYN INTERPRETATION: ABNORMAL
BKR LAB AP LMP: ABNORMAL
BKR LAB AP PREVIOUS ABNORMAL: ABNORMAL
PATH REPORT.COMMENTS IMP SPEC: ABNORMAL
PATH REPORT.COMMENTS IMP SPEC: ABNORMAL
PATH REPORT.RELEVANT HX SPEC: ABNORMAL

## 2025-01-31 PROBLEM — R87.610 ASCUS OF CERVIX WITH NEGATIVE HIGH RISK HPV: Status: ACTIVE | Noted: 2025-01-31

## 2025-02-03 LAB — SCANNED LAB RESULT: NORMAL

## 2025-02-13 ENCOUNTER — PRE VISIT (OUTPATIENT)
Dept: MATERNAL FETAL MEDICINE | Facility: CLINIC | Age: 40
End: 2025-02-13
Payer: COMMERCIAL

## 2025-02-20 ENCOUNTER — OFFICE VISIT (OUTPATIENT)
Dept: MATERNAL FETAL MEDICINE | Facility: CLINIC | Age: 40
End: 2025-02-20
Attending: OBSTETRICS & GYNECOLOGY
Payer: COMMERCIAL

## 2025-02-20 ENCOUNTER — HOSPITAL ENCOUNTER (OUTPATIENT)
Dept: ULTRASOUND IMAGING | Facility: CLINIC | Age: 40
End: 2025-02-20
Attending: OBSTETRICS & GYNECOLOGY
Payer: COMMERCIAL

## 2025-02-20 DIAGNOSIS — O09.299 HISTORY OF PRE-ECLAMPSIA IN PRIOR PREGNANCY, CURRENTLY PREGNANT: ICD-10-CM

## 2025-02-20 DIAGNOSIS — O43.122 VELAMENTOUS INSERTION OF UMBILICAL CORD IN SECOND TRIMESTER: ICD-10-CM

## 2025-02-20 DIAGNOSIS — O26.90 PREGNANCY RELATED CONDITION, ANTEPARTUM: ICD-10-CM

## 2025-02-20 DIAGNOSIS — O09.522 MULTIGRAVIDA OF ADVANCED MATERNAL AGE IN SECOND TRIMESTER: Primary | ICD-10-CM

## 2025-02-20 PROCEDURE — 96041 GENETIC COUNSELING SVC EA 30: CPT

## 2025-02-20 PROCEDURE — 76811 OB US DETAILED SNGL FETUS: CPT

## 2025-02-20 NOTE — NURSING NOTE
Patient presents to [facility] for GC/L2 at 18w0d due to AMA. Denies LOF, vaginal bleeding, cramping/contractions. SBAR given to DANIEL MD, see their note in Epic.

## 2025-02-20 NOTE — PROGRESS NOTES
"[facility] Fetal Medicine [place]  Genetic Counseling Consult    Patient:  Homero Landry YOB: 1985   Date of Service:  25   MRN: [id number]    Homero was seen at the [facility]etal [facility] for genetic consultation. The indication for genetic counseling is advanced maternal age. The patient was unaccompanied to this visit.     The session was conducted in English.      IMPRESSION/ PLAN   1. Homero had genetic screening earlier in this pregnancy. Their non-invasive prenatal test was screen negative or low risk for screened conditions     2. During today's [facility] visit, Homero had a genetic counseling session only. Screening and diagnostic testing was discussed and declined.     3. Homero had a level II comprehensive anatomy ultrasound today. Please see the ultrasound report for further details.    PREGNANCY HISTORY   /Parity:       Homero's pregnancy history is significant for:   AB   Term 10/16 preeclampsia   Term  preeclampsia   SAB    CURRENT PREGNANCY   Current Age: 39 year old     Age at Delivery: 39 year old    BROOKE: 2025, by Last Menstrual Period                                     Gestational Age: 18w0d    This pregnancy is a single gestation.     This pregnancy was conceived spontaneously.    Homero reports no bleeding, complications, illnesses, fever or exposure concerns with this pregnancy.     MEDICAL HISTORY   Homero s reported medical history includes sickle cell trait. Tests results were not available to be reviewed today, but it is documented in her chart she is a carrier of sickle cell trait. See carrier screening section for more details.    FAMILY HISTORY   A three-generation pedigree was obtained today and is scanned under the \"Media\" tab in Epic. The family history was reported by Homero.    The following significant findings were reported today:   One sister with anemia and has a son with " autism  One sister has thyroid cancer diagnosed in her 40s and has a son with autism  One sister has a daughter with down syndrome  Over 95% of cases of Down syndrome result from trisomy 21 caused by nondisjunction.  Rarely, Down syndrome occurs due to a translocation involving chromosome 21, which, if carried by other family members, puts them at increased risk to have a baby with Down syndrome. This is likely a sporadic case in her niece, however a familial translocation cannot be excluded without further details of the affected individual.   Maternal half-brother (41yrs) has really bad asthma and recently got a pacemaker  Her mother () had a brain aneurysm, thyroid cancer and kidney disease.   We reviewed that We discussed that there are genetic conditions that can increase a person risk for developing an aneurysm. We specifically spoke about connective tissue disorders others include polycystic kidney disease and moyamoya syndrome.  I encouraged Homero to share this family history with her primary care provider.   The father of the pregnancy, Aidan (34yrs), is healthy  Aidan has a son and a daughter. Homero reports that Aidan's son has some mental health concerns and some developmental delays    We discussed how most cancer seen in families occurs sporadically, but about 5-10% may be due to an underlying genetic etiology. The couple was encouraged to share this family history information with their primary care providers to ensure appropriate screening. They were also made aware of the [facility]'s cancer risk management clinic if they or their family members are interested in more information      Otherwise, the reported family history is unremarkable for multiple miscarriages, stillbirths, birth defects, intellectual disabilities, known genetic conditions, and consanguinity.       RISK ASSESSMENT FOR CHROMOSOME CONDITIONS   We explained that the risk for fetal chromosome  abnormalities increases with maternal age. We discussed specific features of common chromosome abnormalities, including Down syndrome, trisomy 13, trisomy 18, and sex chromosome trisomies.    At age 39 at midtrimester, the risk to have a baby with Down syndrome is 1 in 168.   At age 39 at midtrimester, the risk to have a baby with any chromosome abnormality is 1 in 82.     Homero had genetic screening earlier in this pregnancy. Their non-invasive prenatal test was screen negative or low risk for screened conditions     Non-invasive prenatal testing (NIPT) results  Maternal plasma cell-free DNA testing  Screens for fetal trisomy 21, trisomy 13, trisomy 18, and sex chromosome aneuploidy  Homero had NIPT earlier in pregnancy; we reviewed the results today, which are low risk.  The NIPT did include sex chromosome aneuploidies and the result was low risk. The predicted sex is XY, which is typically male.  Given the accuracy of this test, these results greatly decrease the chance for certain fetal chromosome abnormalities  We discussed the limitations of normal NIPT results  RISK ASSESSMENT FOR INHERITED CONDITIONS   We discussed that every pregnancy has a chance to have an inherited single-gene condition, even when there is no family history of that condition. In fact, approximately 90% of couples at an increased reproductive risk for an inherited condition have no family history of that condition. The average person may be a carrier for 5-10 different genetic variants that can increase the chance for their pregnancies to have that condition. We discussed autosomal recessive conditions and X-linked conditions. Autosomal recessive conditions happen when a mutation has been inherited from the egg and sperm and include conditions like cystic fibrosis, thalassemia, hearing loss, spinal muscular atrophy, and more. X-linked conditions happen when a mutation has been inherited from the egg and include conditions like fragile  X syndrome.     We reviewed that when both biological parents carry a harmful genetic change in a gene associated with autosomal recessive inheritance, each of their pregnancies has a 1 in 4 (25%) chance to be affected by that condition. With x-linked conditions, the specific risk generally depends on the chromosomal sex of the fetus, with XY individuals (generally male) being most severely affected.      screening was reviewed. About [place]  Screening    Homero reports she is a carrier of sickle cell trait but her partner is not a carrier. She has not had previous carrier screening done.     We discussed that expanded carrier screening is designed to identify carrier status for conditions that are primarily childhood or adolescent onset. Expanded carrier screening does not evaluate for adult-onset conditions such as hereditary cancer syndromes, dementia/ Alzheimer's disease, or cardiovascular disease risk factors. Additionally, expanded carrier screening is not comprehensive for all known genetic diseases or inherited conditions. It will not intentionally screen for autosomal dominant conditions. This is a screening test, and residual carrier status risk figures will be provided to the patient after results become available. Carrier screening is not meant to diagnose the patient with a condition, and generally carriers are asymptomatic. However, certain genes may confer increased risks for various health concerns in carriers (including, but not limited to: JENNIFER, DMD, FMR1).    We reviewed the option of [facility] carrier screening with fetal risk assessment for sickle cell trait. [facility]ITY uses the same technology as standard NIPT to assess risks for genetic conditions affecting pregnancy. The testing uses massive parallel sequencing to assess placental DNA fragments and DNA fragments from the pregnant person, and based on the quantification of regions of interest can provide risk assessments for whole chromosome  abnormalities and a handful of autosomal recessive conditions. The [facility] test first performs routine carrier screening for cystic fibrosis, spinal muscular atrophy and hemoglobinopathies (HBB, HBA1, HBA2) to determine carrier status of the pregnant person through sequencing. If the pregnant person is determined to be a carrier, the testing then reflexes to look for additional variants or additional changes in copy numbers, detected at low levels that would be representative of the cell-free DNA from the pregnancy.     The patient declined the carrier screening options. They are aware the option will remain, and they can contact us if they would like to pursue screening. See below for the more detailed information we dicussed.  GENETIC TESTING OPTIONS   Genetic testing during a pregnancy includes screening and diagnostic procedures.      Screening tests are non-invasive which means no risk to the pregnancy and includes ultrasounds and blood work. The benefits and limitations of screening were reviewed. Screening tests provide a risk assessment (chance) specific to the pregnancy for certain fetal chromosome abnormalities but cannot definitively diagnose or exclude a fetal chromosome abnormality. Follow-up genetic counseling and consideration of diagnostic testing is recommended with any abnormal screening result. Diagnostic testing during a pregnancy is more certain and can test for more conditions. However, the tests do have a risk of miscarriage that requires careful consideration. These tests can detect fetal chromosome abnormalities with greater than 99% certainty. Results can be compromised by maternal cell contamination or mosaicism and are limited by the resolution of current genetic testing technology.     There is no screening or diagnostic test that detects all forms of birth defects or intellectual disability.     We discussed the following screening options:     Carrier screening  Risk assessment for  certain autosomal recessive and x-linked conditions. These conditions are generally infantile- or childhood-onset conditions.   Can be done any time during the pregnancy or prior to pregnancy.  Can screen for over 500 different genetic conditions.  Is not intended to diagnosis a condition in the carrier parent.    Even with negative results, a residual risk for screened conditions remains.      We discussed the following ultrasound options:  Comprehensive level II ultrasound (Fetal Anatomy Ultrasound)  Ultrasound done between 18-20 weeks gestation  Screens for major birth defects and markers for aneuploidy (like trisomy 21 and trisomy 18)  Includes looking at the fetus/baby's growth, heart, organs (stomach, kidneys), placenta, and amniotic fluid    We discussed the following diagnostic options:   Amniocentesis  Invasive diagnostic procedure done after 15 weeks gestation  The procedure collects a small sample of amniotic fluid for the purpose of chromosomal testing and/or other genetic testing  Diagnostic result; more than 99% sensitivity for fetal chromosome abnormalities  Testing for AFP in the amniotic fluid can test for open neural tube defects     It was a pleasure to be involved with Ornecia s care. I spent  50  minutes on the date of the encounter doing chart review, obtaining history, test coordination, documentation, and further activities as noted above.    CALLUM BOCANEGRA MS, [facility]  Genetic Counselor  [facility]  Maternal Fetal Medicine  Office: [phone]   [facility]: [phone]   Fax: [phone]  [facility]

## 2025-02-20 NOTE — PROGRESS NOTES
The patient was seen for an ultrasound in the Maternal-Fetal Medicine Center at the [facility] today.  For a detailed report of the ultrasound examination, please see the ultrasound report which can be found under the imaging tab.    If you have questions regarding today's evaluation or if we can be of further service, please contact the Maternal-Fetal Medicine Center.    Patricia Scruggs MD  , OB/GYN  Maternal-Fetal Medicine  [phone] (Pager)

## 2025-03-26 ENCOUNTER — TELEPHONE (OUTPATIENT)
Dept: OBGYN | Facility: CLINIC | Age: 40
End: 2025-03-26
Payer: COMMERCIAL

## 2025-03-26 DIAGNOSIS — O43.122 VELAMENTOUS INSERTION OF UMBILICAL CORD IN SECOND TRIMESTER: Primary | ICD-10-CM

## 2025-03-26 NOTE — TELEPHONE ENCOUNTER
[facility] Call Center    Phone Message    May a detailed message be left on voicemail: yes     Reason for Call: Other: . Pt just returned back to [place], writer scheduled [facility] visit on 3/28/25, last OV was 1/23/25. Pt stated when she saw MFM on 2/20/25 they recommended pt to have more follow up appts to check on baby's growth, does pt need another order for this? Please call Homero, thank you     Action Taken: Message routed to:  Other: obgyn    Travel Screening: Not Applicable     Date of Service:

## 2025-03-26 NOTE — TELEPHONE ENCOUNTER
Returning patient's call to let her know that she doesn't need a new referral to continue to see MFM.  Advised patient ok to call MFM to schedule an appointment per MFM recommendations.   Offered to provide MFM's phone number- patient states she has it and will call them.     Marycarmen ENAMORADO RN - [facility]

## 2025-04-17 NOTE — PATIENT INSTRUCTIONS
If you have labs or imaging done, the results will automatically release in [facility] without an interpretation.  Your health care professional will review those results and send an interpretation with recommendations as soon as possible, but this may be 1-3 business days.    If you have any questions regarding your visit, please contact your care team.     [facility]: [phone]  [facility] CLINIC HOURS TELEPHONE NUMBER   Rodriguez FLORES Zeyad .      Anaid-MERVAT Wolf-MERVAT [place]-Surgery Scheduler  [place]-         Monday-[place]  8:00 am-4:00 pm  [facility]  8:[facility]4:00 pm  Wednesday-[place] 8:00 am-4:00 pm  Thursday-[place] 8:00 am-4:00 pm    Typical Surgery Day Friday [facility]  [address] [place] [address]  PH: [phone] Fax    Imaging Scheduling all locations  PH: [phone]     [facility] Labor and Delivery  [address]  [place], [place] [address] [phone]    [facility]  [address] [place] [address]  PH: [phone]     **Surgeries** Our Surgery Schedulers will contact you to schedule. If you do not receive a call within 3 business days, please call [phone].  Urgent Care locations:  [facility]       Monday-Friday   10 am - 8 pm  Saturday and Sunday   9 am - 5 pm   [phone]   If you need a medication refill, please contact your pharmacy. Please allow 3 business days for your refill to be completed.  As always, Thank you for trusting us with your healthcare needs!

## 2025-04-22 ENCOUNTER — PRENATAL OFFICE VISIT (OUTPATIENT)
Dept: OBGYN | Facility: CLINIC | Age: 40
End: 2025-04-22
Payer: COMMERCIAL

## 2025-04-22 VITALS
DIASTOLIC BLOOD PRESSURE: 80 MMHG | SYSTOLIC BLOOD PRESSURE: 127 MMHG | WEIGHT: 172.6 LBS | BODY MASS INDEX: 23.41 KG/M2 | HEART RATE: 100 BPM

## 2025-04-22 DIAGNOSIS — O99.012 ANEMIA AFFECTING PREGNANCY IN SECOND TRIMESTER: Primary | ICD-10-CM

## 2025-04-22 DIAGNOSIS — O09.892 SUPERVISION OF OTHER HIGH RISK PREGNANCIES, SECOND TRIMESTER: Primary | ICD-10-CM

## 2025-04-22 LAB
ERYTHROCYTE [DISTWIDTH] IN BLOOD BY AUTOMATED COUNT: 15.9 % (ref 10–15)
GLUCOSE 1H P 50 G GLC PO SERPL-MCNC: 135 MG/DL (ref 70–129)
HCT VFR BLD AUTO: 24.4 % (ref 35–47)
HGB BLD-MCNC: 8.2 G/DL (ref 11.7–15.7)
MCH RBC QN AUTO: 29.1 PG (ref 26.5–33)
MCHC RBC AUTO-ENTMCNC: 33.6 G/DL (ref 31.5–36.5)
MCV RBC AUTO: 87 FL (ref 78–100)
PLATELET # BLD AUTO: 279 10E3/UL (ref 150–450)
RBC # BLD AUTO: 2.82 10E6/UL (ref 3.8–5.2)
T PALLIDUM AB SER QL: NONREACTIVE
WBC # BLD AUTO: 8.5 10E3/UL (ref 4–11)

## 2025-04-22 PROCEDURE — 36415 COLL VENOUS BLD VENIPUNCTURE: CPT | Performed by: GENERAL PRACTICE

## 2025-04-22 PROCEDURE — 99207 PR COMPLICATED OB VISIT: CPT | Performed by: GENERAL PRACTICE

## 2025-04-22 PROCEDURE — 87086 URINE CULTURE/COLONY COUNT: CPT | Performed by: GENERAL PRACTICE

## 2025-04-22 PROCEDURE — 86780 TREPONEMA PALLIDUM: CPT | Performed by: GENERAL PRACTICE

## 2025-04-22 PROCEDURE — 82950 GLUCOSE TEST: CPT | Performed by: GENERAL PRACTICE

## 2025-04-22 PROCEDURE — 85027 COMPLETE CBC AUTOMATED: CPT | Performed by: GENERAL PRACTICE

## 2025-04-22 RX ORDER — HEPARIN SODIUM (PORCINE) LOCK FLUSH IV SOLN 100 UNIT/ML 100 UNIT/ML
5 SOLUTION INTRAVENOUS
OUTPATIENT
Start: 2025-04-22

## 2025-04-22 RX ORDER — ALBUTEROL SULFATE 0.83 MG/ML
2.5 SOLUTION RESPIRATORY (INHALATION)
OUTPATIENT
Start: 2025-04-22

## 2025-04-22 RX ORDER — DIPHENHYDRAMINE HYDROCHLORIDE 50 MG/ML
25 INJECTION, SOLUTION INTRAMUSCULAR; INTRAVENOUS
Start: 2025-04-22

## 2025-04-22 RX ORDER — ALBUTEROL SULFATE 90 UG/1
1-2 INHALANT RESPIRATORY (INHALATION)
Start: 2025-04-22

## 2025-04-22 RX ORDER — ASPIRIN 81 MG/1
81 TABLET, CHEWABLE ORAL DAILY
COMMUNITY

## 2025-04-22 RX ORDER — DIPHENHYDRAMINE HYDROCHLORIDE 50 MG/ML
50 INJECTION, SOLUTION INTRAMUSCULAR; INTRAVENOUS
Start: 2025-04-22

## 2025-04-22 RX ORDER — HEPARIN SODIUM,PORCINE 10 UNIT/ML
5-20 VIAL (ML) INTRAVENOUS DAILY PRN
OUTPATIENT
Start: 2025-04-22

## 2025-04-22 RX ORDER — MEPERIDINE HYDROCHLORIDE 25 MG/ML
25 INJECTION INTRAMUSCULAR; INTRAVENOUS; SUBCUTANEOUS
OUTPATIENT
Start: 2025-04-22

## 2025-04-22 RX ORDER — METHYLPREDNISOLONE SODIUM SUCCINATE 40 MG/ML
40 INJECTION INTRAMUSCULAR; INTRAVENOUS
Start: 2025-04-22

## 2025-04-22 RX ORDER — EPINEPHRINE 1 MG/ML
0.3 INJECTION, SOLUTION INTRAMUSCULAR; SUBCUTANEOUS EVERY 5 MIN PRN
OUTPATIENT
Start: 2025-04-22

## 2025-04-22 NOTE — PROGRESS NOTES
[facility] - Intensive Care ([address] Medicine  Progress Note    Patient Name: Radha Sandoval  MRN: [id number]  Patient Class: IP- Inpatient   Admission Date: 7/22/2023  Length of Stay: 7 days  Attending Physician: Pablo Haddad III,*  Primary Care Provider: Primary Doctor No        Subjective:     Principal Problem:Multiple closed fractures of pelvis without disruption of pelvic ring        HPI:  Radha Sandoval is a 89 y.o. female with PMH significant for chronic BL pleural effusions, recent DVT diagnosed in March '23 (on eliquis), dementia, HTN, with recent hospitalization here at [facility] for pelvic fracture treated non-operatively, who [facility] [place] [facility] while being [place] from wheelchair to bed. Hx taken per Caretaker Sonia and Son (POA) Maykel. Caretaker at bedside unaware of head trauma or other injury. Of note, patient sustained her pelvic fractures at [facility], but after hospitalization at [facility] was placed in [facility] on 7/14. Caretaker reports patient also had another hospitalization during this time at . Patient generally disoriented and delirious, but she will have periods or even days of clarity. At baseline, she is oriented x2. Due to mentation she has difficulty working with staff which results in falls. Prior to her pelvic fracture on 7/8, pt was able to perform independent transfers from wheelchair and could walk short distances with her walker, but she is now max assist. Currently on Eliquis. Patient without complaint on my exam, denies pain.     In the ED: AFVSS. CBC with baseline anemia. CMP reveals slight elevation in Cr 1.0 (baseline 0.8) and K 3.3. BNP and troponin elevated but at baseline. UA grossly infectious. Spann placed for urinary retention.  All imaging reviewed. CTH significant for acute on chronic subdural hygromas. Repeat CTH after 6 hours was stable. XR pelvis reveals stable recent R superior and inferior pubic rami fractures,  Homero Landry is a 39 year old  at 26w5d presenting for routine prenatal care. She is doing ok. Having more pelvic discomforts. Fetal movement has been less than previous.     Denies loss of fluid, vaginal bleeding, contractions.      Objective:  /80   Pulse 100   Wt 78.3 kg (172 lb 9.6 oz)   LMP 10/17/2024 (Exact Date)   BMI 23.41 kg/m    WDWN, NAD  Non-labored breathing  Abdomen soft, nttp  FH: consistent with dates   FHT: 130      A/P: Homero Landry is a 39 year old  at 26w5d presenting for routine ob visit.  No new concerning findings on history or exam.  Fetal status is reassuring today.      ICD-10-CM    1. Supervision of other high risk pregnancies, second trimester  O09.892 Glucose tolerance, gest screen, 1 hour      -Anemia: stopped ferrous sulfate. Will check hgb today.   -Growth scan at 28wks with MFM for valementous cord insertion  -Recommend daily prenatal vitamin  -RPR, GCT and CBC to be completed today  -GBS bacteriuria on NOB labs: plan repeat urine culture today   -B POS; Rhogam at 28 weeks is not indicated  -TDaP next visit  -Follow up in 4 weeks for routine OB visit  -Return precautions reviewed    Rodriguez Jeffers DO, FACOG           non-displaced. EKG in NSR with prolonged Qtc 510 ms. Given tylenol, norco, and 1g rocephin.       Overview/Hospital Course:  Radha Sandoval was placed in  observation after a fall. NSGY consulted for evaluation of SDH and determined no need for intervention , will follow up in clinic in 2 weeks. HOLD eliquis until follow up. Orthopedics discussed surgical options with family and recommended ORIF pelvis, but family is declining at this time and would prefer to continue with plan for PT/OT. Therapy assessment; recs for SNF. WBAT. Dvt ppx with 40 lovenox SQ daily (ok per NSGY). Psychiatry provided recs for medication adjustment for insomnia and delirium with prn dosing for agitation, will monitor response. Agitation persists and patient now expressing suicidal ideation when asked to work with therapy. Psychiatry aware of SI, making med adjustments as indicated. Caretaker has been staying at bedside. Monitor EKG daily given prolonged Qtc. Palliative care consulted for assistance with GOC planning with son and care team, appreciate recs. Pain controlled and mentation at baseline.     Plan to discharge patient on 07/27 to skilled nursing facility, however, patient tachypneic with stridor and increased work of breathing.  Mentation worsened with progressive respiratory alkalosis.  No improvement with breathing treatment or trial of Lasix.  Concern for bronchomalacia with CT images positive for bilateral mainstem bronchi narrowing.  Sats remained stable on 2 L O2.  Discussed with rapid response team and medical ICU.  Patient was moved to ICU step-down unit and monitored closely.  On 07/29 increased work of breathing and stridor spontaneously resolved.  Mentation improved.  Repeat CT images of the brain revealed stable subdural hematomas.      No new subjective & objective note has been filed under this hospital service since the last note was generated.      Assessment/Plan:      * Multiple closed right sided fractures of  pelvis without disruption of pelvic ring  Frequent falls   - diagnosed at last [facility] hospitalization about 2 weeks ago.   - repeat XRs show stable fractures, non-displaced   - Ortho consulted in ED; originally recommended non-op management, but after further discussion recommended ORIF pelvis 7/24. Family declining surgery at this time.  - weight bearing as tolerated   - pain control with tylenol.  Avoiding sedating medications.  - PT/OT consult pending  - patient will likely need SNF v long term placement given max assist requirements and progressive debility, in setting of dementia and behavioral disturbances     Acute metabolic encephalopathy  · Likely progression of dementia versus medication.  · On multiple medications that could contribute that are relatively new according to the son:  Memantine, Robaxin, Lexapro, Haldol  · More confused on 07/28, better on 07/29--discontinued scheduled Robaxin  · Multiple ABGs with progressing respiratory alkalosis, possibly contributing.  · 7/29 repeated CT head with stable left greater than right subdural collections.  More prominent increased density within the left frontal portion of the hemorrhage noted but possibly contrast pooling from the CT from CT neck prior day.  Repeat CT head 7/30, stable SDH.  · Critical care consulted, appreciate assistance    Late onset Alzheimer's dementia with mood disturbance  Suicidal ideation   Insomnia   - baseline per caretaker; however, concerned that patient does not sleep at night, worsening her mentation, agitation, and delirium.   - continue memantine  - on home haloperidol 0.5 PO qhs  - psychiatry consulted for assistance   --> psych recs DC nightly haldol. Start 2.5 mg zyprexa QHS and q8h prn for agitation.    --> 7/25 increase nightly zyprexa to 5 mg   - monitor for improvement   - EKG monitoring   - 7/24-25 Pt reporting SI from patient during session. Caretaker says this is not new, patient expresses this intermittently. Psych  aware. Med adjustments made.    Subdural hygroma  Acute subdural hematoma   - Acute SDH on chronic SD hygroma s/p fall at [facility]; no acute deficits  - repeat CTH stable   - holding eliquis for now - may continue in 2 weeks (per NSGY)   - > NSGY ok's starting LWMH for dvt ppx, holding for now with acute number  - INR wnl  - NSGY cleared. No need for continued monitoring aside from 2 week clinic follow up.     Acute hypoxemic respiratory failure  Respiratory alkalosis  · Tachypneic, using accessory muscles, intermittent stridor 7/28 resolved spontaneously on 07/29  · Trial of IV Lasix administered for pleural effusions seen on the CT chest 7/27.  · Echo 7/28 EF 65%, grade 1 diastolic dysfunction, mild AS, normal RV size and function, PA systolic pressure 39 mm Hg, normal CVP  · Holding Lasix as patient has  · Bronchodilators without much improvement  · CTA chest and CT soft tissue neck pending, negative for PE, negative for any mass compressing the airway narrowing of the bilateral mainstem bronchi to 2 mm diameter not present on previous CT suggesting possible transient finding.  Possible transient bronchomalacia.    Prolonged Q-T interval on ECG  - continue to monitor, increasing  - avoid QT prolonging meds as much as possible  - change Zyprexa to Depakote per Psychiatry  - monitor tele     Urinary retention  Recurrent issue   - mild elevation in Cr likely d/t retention   - duncan placed, removed with decent urination, had to be replaced on 07/30 following recurrence of retention        Goals of care, counseling/discussion  - Caretaker Sonia expresses several concerns regarding patients continual health and function decline  - Recent stay at SNF with progress noted and pt was placed in senior care at [facility]. However, [facility] is not the appropriate level of care for patient given dementia and frequent falls   - [facility] will now require 24 hour sitters for patient to return   - DILIA TAYLOR consulted for dc planning   - will need to have an  in-depth palliative discussion with son to determine best care plan for patient - consult placed   - son agreeable to discussion.  Patient remains full code.    Hypokalemia  · Monitor K/Mag and replace as needed      Lumbar spondylosis with myelopathy  - tylenol for pain control, robaxin as needed    Abnormal urinalysis  Recurrent UTIs  - initial UA concern for infection, completed Rocephin  - urine culture with Candida glabrata 10 K to 49 K units not consistent with convincing UTI.  - duncan placed for U-retention, removed with further urinary retention.  Replaced Duncan on 07/30  -will leave Duncan on discharge, patient will need outpatient Urology for voiding trial.  - AFVSS, no leukocytosis.         Primary hypertension  - BP uncontrolled intermittently, as high as 226/90 -- suspect bc patient was agitated and spitting out meds  - continue lisinopril, lopressor, lasix.  Added nifedipine 7/30  - PRN IV hydralazine for SBP >180 or if unable to tolerate oral meds.         VTE Risk Mitigation (From admission, onward)         Ordered     Reason for No Pharmacological VTE Prophylaxis  Once        Question:  Reasons:  Answer:  Risk of Bleeding  Comment:  SDH    07/23/23 0831     IP VTE HIGH RISK PATIENT  Once         07/23/23 0831     Place sequential compression device  Until discontinued         07/23/23 0831                Discharge Planning   TOSHA: 7/31/2023     Code Status: Full Code   Is the patient medically ready for discharge?: No    Reason for patient still in hospital (select all that apply): Patient trending condition, Laboratory test, Treatment, Imaging and Pending disposition  Discharge Plan A: Skilled Nursing Facility                  Pablo Haddad III, MD  Department of Hospital Medicine   [facility] - Intensive Care ([place]-[facility])

## 2025-04-23 LAB — BACTERIA UR CULT: NORMAL

## 2025-05-01 ENCOUNTER — APPOINTMENT (OUTPATIENT)
Dept: ULTRASOUND IMAGING | Facility: CLINIC | Age: 40
End: 2025-05-01
Attending: EMERGENCY MEDICINE
Payer: COMMERCIAL

## 2025-05-01 ENCOUNTER — HOSPITAL ENCOUNTER (INPATIENT)
Facility: CLINIC | Age: 40
LOS: 1 days | Discharge: HOME | End: 2025-05-01
Attending: EMERGENCY MEDICINE
Payer: COMMERCIAL

## 2025-05-01 VITALS
WEIGHT: 172 LBS | SYSTOLIC BLOOD PRESSURE: 131 MMHG | RESPIRATION RATE: 16 BRPM | HEART RATE: 99 BPM | BODY MASS INDEX: 23.33 KG/M2 | OXYGEN SATURATION: 100 % | DIASTOLIC BLOOD PRESSURE: 98 MMHG | TEMPERATURE: 97.6 F

## 2025-05-01 DIAGNOSIS — S09.90XA CLOSED HEAD INJURY, INITIAL ENCOUNTER: ICD-10-CM

## 2025-05-01 DIAGNOSIS — Z3A.28 28 WEEKS GESTATION OF PREGNANCY: Primary | ICD-10-CM

## 2025-05-01 DIAGNOSIS — S39.91XA BLUNT TRAUMA TO ABDOMEN, INITIAL ENCOUNTER: ICD-10-CM

## 2025-05-01 DIAGNOSIS — Z34.92 SECOND TRIMESTER PREGNANCY: ICD-10-CM

## 2025-05-01 LAB
ALBUMIN SERPL BCG-MCNC: 3.8 G/DL (ref 3.5–5.2)
ALP SERPL-CCNC: 101 U/L (ref 40–150)
ALT SERPL W P-5'-P-CCNC: 8 U/L (ref 0–50)
ANION GAP SERPL CALCULATED.3IONS-SCNC: 10 MMOL/L (ref 7–15)
AST SERPL W P-5'-P-CCNC: 12 U/L (ref 0–45)
BASOPHILS # BLD AUTO: 0 10E3/UL (ref 0–0.2)
BASOPHILS NFR BLD AUTO: 0 %
BILIRUB SERPL-MCNC: 0.2 MG/DL
BUN SERPL-MCNC: 14.1 MG/DL (ref 6–20)
CALCIUM SERPL-MCNC: 13.1 MG/DL (ref 8.8–10.4)
CHLORIDE SERPL-SCNC: 109 MMOL/L (ref 98–107)
CREAT SERPL-MCNC: 0.91 MG/DL (ref 0.51–0.95)
EGFRCR SERPLBLD CKD-EPI 2021: 83 ML/MIN/1.73M2
EOSINOPHIL # BLD AUTO: 0.3 10E3/UL (ref 0–0.7)
EOSINOPHIL NFR BLD AUTO: 3 %
ERYTHROCYTE [DISTWIDTH] IN BLOOD BY AUTOMATED COUNT: 15.2 % (ref 10–15)
GLUCOSE SERPL-MCNC: 119 MG/DL (ref 70–99)
HCO3 SERPL-SCNC: 16 MMOL/L (ref 22–29)
HCT VFR BLD AUTO: 25.5 % (ref 35–47)
HGB BLD-MCNC: 8.3 G/DL (ref 11.7–15.7)
IMM GRANULOCYTES # BLD: 0 10E3/UL
IMM GRANULOCYTES NFR BLD: 0 %
LIPASE SERPL-CCNC: 62 U/L (ref 13–60)
LYMPHOCYTES # BLD AUTO: 2.2 10E3/UL (ref 0.8–5.3)
LYMPHOCYTES NFR BLD AUTO: 24 %
MCH RBC QN AUTO: 29.1 PG (ref 26.5–33)
MCHC RBC AUTO-ENTMCNC: 32.5 G/DL (ref 31.5–36.5)
MCV RBC AUTO: 90 FL (ref 78–100)
MONOCYTES # BLD AUTO: 0.5 10E3/UL (ref 0–1.3)
MONOCYTES NFR BLD AUTO: 5 %
NEUTROPHILS # BLD AUTO: 6.1 10E3/UL (ref 1.6–8.3)
NEUTROPHILS NFR BLD AUTO: 67 %
NRBC # BLD AUTO: 0 10E3/UL
NRBC BLD AUTO-RTO: 0 /100
PLATELET # BLD AUTO: 188 10E3/UL (ref 150–450)
POTASSIUM SERPL-SCNC: 4.4 MMOL/L (ref 3.4–5.3)
PROT SERPL-MCNC: 7 G/DL (ref 6.4–8.3)
RBC # BLD AUTO: 2.85 10E6/UL (ref 3.8–5.2)
SODIUM SERPL-SCNC: 135 MMOL/L (ref 135–145)
WBC # BLD AUTO: 9.2 10E3/UL (ref 4–11)

## 2025-05-01 PROCEDURE — 120N000001 HC R&B MED SURG/OB

## 2025-05-01 PROCEDURE — 76700 US EXAM ABDOM COMPLETE: CPT

## 2025-05-01 PROCEDURE — 36415 COLL VENOUS BLD VENIPUNCTURE: CPT | Performed by: EMERGENCY MEDICINE

## 2025-05-01 PROCEDURE — 82435 ASSAY OF BLOOD CHLORIDE: CPT | Performed by: EMERGENCY MEDICINE

## 2025-05-01 PROCEDURE — 59025 FETAL NON-STRESS TEST: CPT | Mod: 6MC

## 2025-05-01 PROCEDURE — 76815 OB US LIMITED FETUS(S): CPT

## 2025-05-01 PROCEDURE — 99284 EMERGENCY DEPT VISIT MOD MDM: CPT | Mod: 25

## 2025-05-01 PROCEDURE — 85025 COMPLETE CBC W/AUTO DIFF WBC: CPT | Performed by: EMERGENCY MEDICINE

## 2025-05-01 PROCEDURE — 83690 ASSAY OF LIPASE: CPT | Performed by: EMERGENCY MEDICINE

## 2025-05-01 PROCEDURE — 59025 FETAL NON-STRESS TEST: CPT | Mod: 26 | Performed by: STUDENT IN AN ORGANIZED HEALTH CARE EDUCATION/TRAINING PROGRAM

## 2025-05-01 RX ORDER — DIPHENHYDRAMINE HCL 25 MG
25 CAPSULE ORAL EVERY 6 HOURS PRN
Status: DISCONTINUED | OUTPATIENT
Start: 2025-05-01 | End: 2025-05-02 | Stop reason: HOSPADM

## 2025-05-01 RX ORDER — ACETAMINOPHEN 325 MG/1
650 TABLET ORAL EVERY 4 HOURS PRN
Status: DISCONTINUED | OUTPATIENT
Start: 2025-05-01 | End: 2025-05-02 | Stop reason: HOSPADM

## 2025-05-01 ASSESSMENT — COLUMBIA-SUICIDE SEVERITY RATING SCALE - C-SSRS
1. IN THE PAST MONTH, HAVE YOU WISHED YOU WERE DEAD OR WISHED YOU COULD GO TO SLEEP AND NOT WAKE UP?: NO
2. HAVE YOU ACTUALLY HAD ANY THOUGHTS OF KILLING YOURSELF IN THE PAST MONTH?: NO
6. HAVE YOU EVER DONE ANYTHING, STARTED TO DO ANYTHING, OR PREPARED TO DO ANYTHING TO END YOUR LIFE?: NO

## 2025-05-01 ASSESSMENT — ACTIVITIES OF DAILY LIVING (ADL)
ADLS_ACUITY_SCORE: 41

## 2025-05-02 ENCOUNTER — HOSPITAL ENCOUNTER (OUTPATIENT)
Dept: ULTRASOUND IMAGING | Facility: CLINIC | Age: 40
Discharge: HOME | End: 2025-05-02
Attending: OBSTETRICS & GYNECOLOGY
Payer: COMMERCIAL

## 2025-05-02 DIAGNOSIS — O43.122 VELAMENTOUS INSERTION OF UMBILICAL CORD IN SECOND TRIMESTER: ICD-10-CM

## 2025-05-02 PROCEDURE — 76816 OB US FOLLOW-UP PER FETUS: CPT

## 2025-05-02 PROCEDURE — 99213 OFFICE O/P EST LOW 20 MIN: CPT | Mod: 25 | Performed by: OBSTETRICS & GYNECOLOGY

## 2025-05-02 PROCEDURE — 76816 OB US FOLLOW-UP PER FETUS: CPT | Mod: 26 | Performed by: OBSTETRICS & GYNECOLOGY

## 2025-05-02 NOTE — ED NOTES
Patient does not want to stay due to no  at home. MD aware and MD educated patient on risks with her leaving AMA form signed.
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
## 2025-05-05 ENCOUNTER — HOSPITAL ENCOUNTER (OUTPATIENT)
Facility: CLINIC | Age: 40
Discharge: HOME | End: 2025-05-05
Attending: OBSTETRICS & GYNECOLOGY | Admitting: OBSTETRICS & GYNECOLOGY
Payer: COMMERCIAL

## 2025-05-05 ENCOUNTER — HOSPITAL ENCOUNTER (OUTPATIENT)
Facility: CLINIC | Age: 40
End: 2025-05-05
Admitting: OBSTETRICS & GYNECOLOGY
Payer: COMMERCIAL

## 2025-05-05 VITALS — SYSTOLIC BLOOD PRESSURE: 127 MMHG | RESPIRATION RATE: 18 BRPM | TEMPERATURE: 97.9 F | DIASTOLIC BLOOD PRESSURE: 86 MMHG

## 2025-05-05 PROBLEM — Z36.89 ENCOUNTER FOR TRIAGE IN PREGNANT PATIENT: Status: ACTIVE | Noted: 2025-05-05

## 2025-05-05 LAB
ALBUMIN MFR UR ELPH: 17 MG/DL
ALBUMIN SERPL BCG-MCNC: 3.7 G/DL (ref 3.5–5.2)
ALBUMIN UR-MCNC: NEGATIVE MG/DL
ALP SERPL-CCNC: 101 U/L (ref 40–150)
ALT SERPL W P-5'-P-CCNC: 10 U/L (ref 0–50)
ANION GAP SERPL CALCULATED.3IONS-SCNC: 13 MMOL/L (ref 7–15)
APPEARANCE UR: CLEAR
AST SERPL W P-5'-P-CCNC: 17 U/L (ref 0–45)
BILIRUB SERPL-MCNC: 0.2 MG/DL
BILIRUB UR QL STRIP: NEGATIVE
BUN SERPL-MCNC: 6.2 MG/DL (ref 6–20)
CALCIUM SERPL-MCNC: 13.3 MG/DL (ref 8.8–10.4)
CHLORIDE SERPL-SCNC: 108 MMOL/L (ref 98–107)
CLUE CELLS: PRESENT
COLOR UR AUTO: ABNORMAL
CREAT SERPL-MCNC: 0.69 MG/DL (ref 0.51–0.95)
CREAT UR-MCNC: 75.1 MG/DL
EGFRCR SERPLBLD CKD-EPI 2021: >90 ML/MIN/1.73M2
ERYTHROCYTE [DISTWIDTH] IN BLOOD BY AUTOMATED COUNT: 15.1 % (ref 10–15)
GLUCOSE SERPL-MCNC: 93 MG/DL (ref 70–99)
GLUCOSE UR STRIP-MCNC: NEGATIVE MG/DL
HCO3 SERPL-SCNC: 14 MMOL/L (ref 22–29)
HCT VFR BLD AUTO: 25.9 % (ref 35–47)
HGB BLD-MCNC: 8.4 G/DL (ref 11.7–15.7)
HGB UR QL STRIP: NEGATIVE
HYALINE CASTS: 2 /LPF
KETONES UR STRIP-MCNC: NEGATIVE MG/DL
LEUKOCYTE ESTERASE UR QL STRIP: ABNORMAL
LIPASE SERPL-CCNC: 40 U/L (ref 13–60)
MCH RBC QN AUTO: 29.7 PG (ref 26.5–33)
MCHC RBC AUTO-ENTMCNC: 32.4 G/DL (ref 31.5–36.5)
MCV RBC AUTO: 92 FL (ref 78–100)
NITRATE UR QL: NEGATIVE
PH UR STRIP: 7 [PH] (ref 5–7)
PLATELET # BLD AUTO: 167 10E3/UL (ref 150–450)
POTASSIUM SERPL-SCNC: 4.6 MMOL/L (ref 3.4–5.3)
PROT SERPL-MCNC: 6.9 G/DL (ref 6.4–8.3)
PROT/CREAT 24H UR: 0.23 MG/MG CR (ref 0–0.2)
RBC # BLD AUTO: 2.83 10E6/UL (ref 3.8–5.2)
RBC URINE: 1 /HPF
SODIUM SERPL-SCNC: 135 MMOL/L (ref 135–145)
SP GR UR STRIP: 1.01 (ref 1–1.03)
SQUAMOUS EPITHELIAL: 3 /HPF
TRICHOMONAS, WET PREP: ABNORMAL
UROBILINOGEN UR STRIP-MCNC: NORMAL MG/DL
WBC # BLD AUTO: 10.1 10E3/UL (ref 4–11)
WBC URINE: 5 /HPF
WBC'S/HIGH POWER FIELD, WET PREP: ABNORMAL
YEAST, WET PREP: ABNORMAL

## 2025-05-05 PROCEDURE — 59025 FETAL NON-STRESS TEST: CPT | Performed by: OBSTETRICS & GYNECOLOGY

## 2025-05-05 PROCEDURE — 84156 ASSAY OF PROTEIN URINE: CPT | Performed by: OBSTETRICS & GYNECOLOGY

## 2025-05-05 PROCEDURE — 59025 FETAL NON-STRESS TEST: CPT | Mod: 26 | Performed by: OBSTETRICS & GYNECOLOGY

## 2025-05-05 PROCEDURE — 82247 BILIRUBIN TOTAL: CPT | Performed by: OBSTETRICS & GYNECOLOGY

## 2025-05-05 PROCEDURE — 85014 HEMATOCRIT: CPT | Performed by: OBSTETRICS & GYNECOLOGY

## 2025-05-05 PROCEDURE — 83690 ASSAY OF LIPASE: CPT | Performed by: OBSTETRICS & GYNECOLOGY

## 2025-05-05 PROCEDURE — G0463 HOSPITAL OUTPT CLINIC VISIT: HCPCS | Mod: 25

## 2025-05-05 PROCEDURE — 250N000013 HC RX MED GY IP 250 OP 250 PS 637: Performed by: OBSTETRICS & GYNECOLOGY

## 2025-05-05 PROCEDURE — 36415 COLL VENOUS BLD VENIPUNCTURE: CPT | Performed by: OBSTETRICS & GYNECOLOGY

## 2025-05-05 PROCEDURE — 81001 URINALYSIS AUTO W/SCOPE: CPT | Performed by: OBSTETRICS & GYNECOLOGY

## 2025-05-05 PROCEDURE — 87210 SMEAR WET MOUNT SALINE/INK: CPT | Performed by: OBSTETRICS & GYNECOLOGY

## 2025-05-05 PROCEDURE — 59025 FETAL NON-STRESS TEST: CPT

## 2025-05-05 RX ORDER — ACETAMINOPHEN 325 MG/1
TABLET ORAL
Status: COMPLETED
Start: 2025-05-05 | End: 2025-05-05

## 2025-05-05 RX ORDER — ACETAMINOPHEN 325 MG/1
650 TABLET ORAL EVERY 4 HOURS PRN
Status: DISCONTINUED | OUTPATIENT
Start: 2025-05-05 | End: 2025-05-05 | Stop reason: HOSPADM

## 2025-05-05 RX ADMIN — ACETAMINOPHEN 650 MG: 325 TABLET, FILM COATED ORAL at 17:53

## 2025-05-05 RX ADMIN — ACETAMINOPHEN 650 MG: 325 TABLET ORAL at 17:53

## 2025-05-05 ASSESSMENT — ACTIVITIES OF DAILY LIVING (ADL)
ADLS_ACUITY_SCORE: 15
ADLS_ACUITY_SCORE: 41
ADLS_ACUITY_SCORE: 15
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
## 2025-05-06 ENCOUNTER — INFUSION THERAPY VISIT (OUTPATIENT)
Dept: INFUSION THERAPY | Facility: CLINIC | Age: 40
End: 2025-05-06
Attending: PHYSICIAN ASSISTANT
Payer: COMMERCIAL

## 2025-05-06 VITALS
TEMPERATURE: 98 F | DIASTOLIC BLOOD PRESSURE: 76 MMHG | HEART RATE: 106 BPM | OXYGEN SATURATION: 100 % | RESPIRATION RATE: 20 BRPM | SYSTOLIC BLOOD PRESSURE: 117 MMHG

## 2025-05-06 DIAGNOSIS — O99.012 ANEMIA AFFECTING PREGNANCY IN SECOND TRIMESTER: Primary | ICD-10-CM

## 2025-05-06 PROCEDURE — 96365 THER/PROPH/DIAG IV INF INIT: CPT

## 2025-05-06 PROCEDURE — 258N000003 HC RX IP 258 OP 636: Performed by: GENERAL PRACTICE

## 2025-05-06 PROCEDURE — 250N000011 HC RX IP 250 OP 636: Mod: JZ | Performed by: GENERAL PRACTICE

## 2025-05-06 RX ORDER — ALBUTEROL SULFATE 90 UG/1
1-2 INHALANT RESPIRATORY (INHALATION)
Status: CANCELLED
Start: 2025-05-08

## 2025-05-06 RX ORDER — ALBUTEROL SULFATE 0.83 MG/ML
2.5 SOLUTION RESPIRATORY (INHALATION)
Status: CANCELLED | OUTPATIENT
Start: 2025-05-08

## 2025-05-06 RX ORDER — MEPERIDINE HYDROCHLORIDE 25 MG/ML
25 INJECTION INTRAMUSCULAR; INTRAVENOUS; SUBCUTANEOUS
Status: CANCELLED | OUTPATIENT
Start: 2025-05-08

## 2025-05-06 RX ORDER — HEPARIN SODIUM (PORCINE) LOCK FLUSH IV SOLN 100 UNIT/ML 100 UNIT/ML
5 SOLUTION INTRAVENOUS
Status: CANCELLED | OUTPATIENT
Start: 2025-05-08

## 2025-05-06 RX ORDER — DIPHENHYDRAMINE HYDROCHLORIDE 50 MG/ML
25 INJECTION, SOLUTION INTRAMUSCULAR; INTRAVENOUS
Status: CANCELLED
Start: 2025-05-08

## 2025-05-06 RX ORDER — HEPARIN SODIUM,PORCINE 10 UNIT/ML
5-20 VIAL (ML) INTRAVENOUS DAILY PRN
Status: CANCELLED | OUTPATIENT
Start: 2025-05-08

## 2025-05-06 RX ORDER — METHYLPREDNISOLONE SODIUM SUCCINATE 40 MG/ML
40 INJECTION INTRAMUSCULAR; INTRAVENOUS
Status: CANCELLED
Start: 2025-05-08

## 2025-05-06 RX ORDER — EPINEPHRINE 1 MG/ML
0.3 INJECTION, SOLUTION INTRAMUSCULAR; SUBCUTANEOUS EVERY 5 MIN PRN
Status: CANCELLED | OUTPATIENT
Start: 2025-05-08

## 2025-05-06 RX ORDER — DIPHENHYDRAMINE HYDROCHLORIDE 50 MG/ML
50 INJECTION, SOLUTION INTRAMUSCULAR; INTRAVENOUS
Status: CANCELLED
Start: 2025-05-08

## 2025-05-06 RX ADMIN — IRON SUCROSE 300 MG: 20 INJECTION, SOLUTION INTRAVENOUS at 13:00

## 2025-05-06 ASSESSMENT — PAIN SCALES - GENERAL: PAINLEVEL_OUTOF10: NO PAIN (0)

## 2025-05-06 NOTE — PROGRESS NOTES
Infusion Nursing Note:  Homero Landry presents today for Venofer 300mg.    Patient seen by provider today: No   present during visit today: Not Applicable.    Note: N/A.      Intravenous Access:  Peripheral IV placed.    Treatment Conditions:  Not Applicable.      Post Infusion Assessment:  Patient tolerated infusion without incident.  Blood return noted pre and post infusion.  Site patent and intact, free from redness, edema or discomfort.  No evidence of extravasations.  Access discontinued per protocol.       Discharge Plan:   Patient declined prescription refills.  Discharge instructions reviewed with: Patient.  Patient verbalized understanding of discharge instructions and all questions answered.  AVS to patient via [employer].  Patient will return 5/8/25 for next appointment.   Patient discharged in stable condition accompanied by: self.  Departure Mode: Ambulatory.      Winter Chicas RN

## 2025-05-08 ENCOUNTER — INFUSION THERAPY VISIT (OUTPATIENT)
Dept: INFUSION THERAPY | Facility: CLINIC | Age: 40
End: 2025-05-08
Payer: COMMERCIAL

## 2025-05-08 VITALS
HEART RATE: 100 BPM | OXYGEN SATURATION: 100 % | DIASTOLIC BLOOD PRESSURE: 96 MMHG | SYSTOLIC BLOOD PRESSURE: 138 MMHG | TEMPERATURE: 98.1 F | RESPIRATION RATE: 20 BRPM

## 2025-05-08 DIAGNOSIS — O99.012 ANEMIA AFFECTING PREGNANCY IN SECOND TRIMESTER: Primary | ICD-10-CM

## 2025-05-08 LAB
BACTERIA UR CULT: ABNORMAL
BACTERIA UR CULT: ABNORMAL

## 2025-05-08 PROCEDURE — 250N000011 HC RX IP 250 OP 636: Performed by: GENERAL PRACTICE

## 2025-05-08 PROCEDURE — 258N000003 HC RX IP 258 OP 636: Performed by: GENERAL PRACTICE

## 2025-05-08 RX ORDER — HEPARIN SODIUM (PORCINE) LOCK FLUSH IV SOLN 100 UNIT/ML 100 UNIT/ML
5 SOLUTION INTRAVENOUS
OUTPATIENT
Start: 2025-05-10

## 2025-05-08 RX ORDER — DIPHENHYDRAMINE HYDROCHLORIDE 50 MG/ML
50 INJECTION, SOLUTION INTRAMUSCULAR; INTRAVENOUS
Start: 2025-05-10

## 2025-05-08 RX ORDER — ALBUTEROL SULFATE 0.83 MG/ML
2.5 SOLUTION RESPIRATORY (INHALATION)
OUTPATIENT
Start: 2025-05-10

## 2025-05-08 RX ORDER — DIPHENHYDRAMINE HYDROCHLORIDE 50 MG/ML
25 INJECTION, SOLUTION INTRAMUSCULAR; INTRAVENOUS
Start: 2025-05-10

## 2025-05-08 RX ORDER — MEPERIDINE HYDROCHLORIDE 25 MG/ML
25 INJECTION INTRAMUSCULAR; INTRAVENOUS; SUBCUTANEOUS
OUTPATIENT
Start: 2025-05-10

## 2025-05-08 RX ORDER — METHYLPREDNISOLONE SODIUM SUCCINATE 40 MG/ML
40 INJECTION INTRAMUSCULAR; INTRAVENOUS
Start: 2025-05-10

## 2025-05-08 RX ORDER — EPINEPHRINE 1 MG/ML
0.3 INJECTION, SOLUTION INTRAMUSCULAR; SUBCUTANEOUS EVERY 5 MIN PRN
OUTPATIENT
Start: 2025-05-10

## 2025-05-08 RX ORDER — ALBUTEROL SULFATE 90 UG/1
1-2 INHALANT RESPIRATORY (INHALATION)
Start: 2025-05-10

## 2025-05-08 RX ORDER — HEPARIN SODIUM,PORCINE 10 UNIT/ML
5-20 VIAL (ML) INTRAVENOUS DAILY PRN
OUTPATIENT
Start: 2025-05-10

## 2025-05-08 RX ADMIN — IRON SUCROSE 300 MG: 20 INJECTION, SOLUTION INTRAVENOUS at 12:49

## 2025-05-08 NOTE — PROGRESS NOTES
Infusion Nursing Note:  Homero Landry presents today for Venofer 300.    Patient seen by provider today: No   present during visit today: Not Applicable.    Note: N/A.      Intravenous Access:  Peripheral IV placed.    Treatment Conditions:  Not Applicable.      Post Infusion Assessment:  Patient tolerated infusion without incident.  Blood return noted pre and post infusion.  Site patent and intact, free from redness, edema or discomfort.  No evidence of extravasations.  Access discontinued per protocol.       Discharge Plan:   Patient declined prescription refills.  Discharge instructions reviewed with: Patient.  Patient and/or family verbalized understanding of discharge instructions and all questions answered.  AVS to patient via [employer].  Patient will return 5/12/25 for next appointment.   Patient discharged in stable condition accompanied by: self.  Departure Mode: Ambulatory.      Winter Chicas RN

## 2025-05-09 ENCOUNTER — RESULTS FOLLOW-UP (OUTPATIENT)
Dept: OBGYN | Facility: CLINIC | Age: 40
End: 2025-05-09

## 2025-05-09 DIAGNOSIS — N39.0 URINARY TRACT INFECTION WITHOUT HEMATURIA, SITE UNSPECIFIED: Primary | ICD-10-CM

## 2025-05-09 RX ORDER — NITROFURANTOIN 25; 75 MG/1; MG/1
100 CAPSULE ORAL 2 TIMES DAILY
Qty: 14 CAPSULE | Refills: 0 | Status: SHIPPED | OUTPATIENT
Start: 2025-05-09

## 2025-05-12 ENCOUNTER — INFUSION THERAPY VISIT (OUTPATIENT)
Dept: INFUSION THERAPY | Facility: CLINIC | Age: 40
End: 2025-05-12
Payer: COMMERCIAL

## 2025-05-12 VITALS
DIASTOLIC BLOOD PRESSURE: 89 MMHG | TEMPERATURE: 98.2 F | RESPIRATION RATE: 16 BRPM | SYSTOLIC BLOOD PRESSURE: 123 MMHG | OXYGEN SATURATION: 100 % | HEART RATE: 101 BPM

## 2025-05-12 DIAGNOSIS — O99.012 ANEMIA AFFECTING PREGNANCY IN SECOND TRIMESTER: Primary | ICD-10-CM

## 2025-05-12 PROCEDURE — 96365 THER/PROPH/DIAG IV INF INIT: CPT

## 2025-05-12 PROCEDURE — 250N000011 HC RX IP 250 OP 636: Mod: JZ | Performed by: GENERAL PRACTICE

## 2025-05-12 PROCEDURE — 258N000003 HC RX IP 258 OP 636: Performed by: GENERAL PRACTICE

## 2025-05-12 PROCEDURE — 96366 THER/PROPH/DIAG IV INF ADDON: CPT

## 2025-05-12 RX ORDER — MEPERIDINE HYDROCHLORIDE 25 MG/ML
25 INJECTION INTRAMUSCULAR; INTRAVENOUS; SUBCUTANEOUS
Status: CANCELLED | OUTPATIENT
Start: 2025-05-12

## 2025-05-12 RX ORDER — DIPHENHYDRAMINE HYDROCHLORIDE 50 MG/ML
50 INJECTION, SOLUTION INTRAMUSCULAR; INTRAVENOUS
Status: CANCELLED
Start: 2025-05-12

## 2025-05-12 RX ORDER — ALBUTEROL SULFATE 0.83 MG/ML
2.5 SOLUTION RESPIRATORY (INHALATION)
Status: CANCELLED | OUTPATIENT
Start: 2025-05-12

## 2025-05-12 RX ORDER — EPINEPHRINE 1 MG/ML
0.3 INJECTION, SOLUTION INTRAMUSCULAR; SUBCUTANEOUS EVERY 5 MIN PRN
Status: CANCELLED | OUTPATIENT
Start: 2025-05-12

## 2025-05-12 RX ORDER — HEPARIN SODIUM (PORCINE) LOCK FLUSH IV SOLN 100 UNIT/ML 100 UNIT/ML
5 SOLUTION INTRAVENOUS
Status: CANCELLED | OUTPATIENT
Start: 2025-05-12

## 2025-05-12 RX ORDER — METHYLPREDNISOLONE SODIUM SUCCINATE 40 MG/ML
40 INJECTION INTRAMUSCULAR; INTRAVENOUS
Status: CANCELLED
Start: 2025-05-12

## 2025-05-12 RX ORDER — ALBUTEROL SULFATE 90 UG/1
1-2 INHALANT RESPIRATORY (INHALATION)
Status: CANCELLED
Start: 2025-05-12

## 2025-05-12 RX ORDER — HEPARIN SODIUM,PORCINE 10 UNIT/ML
5-20 VIAL (ML) INTRAVENOUS DAILY PRN
Status: CANCELLED | OUTPATIENT
Start: 2025-05-12

## 2025-05-12 RX ORDER — DIPHENHYDRAMINE HYDROCHLORIDE 50 MG/ML
25 INJECTION, SOLUTION INTRAMUSCULAR; INTRAVENOUS
Status: CANCELLED
Start: 2025-05-12

## 2025-05-12 RX ADMIN — SODIUM CHLORIDE 250 ML: 0.9 INJECTION, SOLUTION INTRAVENOUS at 09:59

## 2025-05-12 RX ADMIN — IRON SUCROSE 300 MG: 20 INJECTION, SOLUTION INTRAVENOUS at 09:59

## 2025-05-12 ASSESSMENT — PAIN SCALES - GENERAL: PAINLEVEL_OUTOF10: NO PAIN (0)

## 2025-05-12 NOTE — PROGRESS NOTES
Infusion Nursing Note:  Homero Landry presents today for venofer 3/3.    Patient seen by provider today: No   present during visit today: Not Applicable.    Note: pt denies any changes in health or new concerns.      Intravenous Access:  Peripheral IV placed.    Treatment Conditions:  Not Applicable.      Post Infusion Assessment:  Patient tolerated infusion without incident.  Site patent and intact, free from redness, edema or discomfort.  No evidence of extravasations.  Access discontinued per protocol.       Discharge Plan:   Patient and/or family verbalized understanding of discharge instructions and all questions answered.  AVS to patient via [employer].    Patient discharged in stable condition accompanied by: significant other.  Departure Mode: Ambulatory.      Ashley Pandya RN

## 2025-05-12 NOTE — PATIENT INSTRUCTIONS
If you have labs or imaging done, the results will automatically release in [facility] without an interpretation.  Your health care professional will review those results and send an interpretation with recommendations as soon as possible, but this may be 1-3 business days.    If you have any questions regarding your visit, please contact your care team.     [facility] [employer]: [phone]  [facility] CLINIC HOURS TELEPHONE NUMBER   Rodriguez FLORES Zeyad .      Anaid-MERVAT Wolf-MERVAT Trujillo-Surgery Scheduler  Geeta-         Monday-[place]  8:00 am-4:00 pm  [facility]00 [facility]  Wednesday-[place] 8:00 am-4:00 pm  Thursday-[place] 8:00 am-4:00 pm    Typical Surgery Day Friday [facility]  [address] [place] [address]  PH: [phone] Fax    Imaging Scheduling all locations  PH: [phone]     [facility] Labor and Delivery  [address]  [place], [place] [address] [phone]    [facility]  [address] [place] [address]  PH: [phone]     **Surgeries** Our Surgery Schedulers will contact you to schedule. If you do not receive a call within 3 business days, please call [phone].  Urgent Care locations:  [facility]       Monday-Friday   10 am - 8 pm  Saturday and Sunday   9 am - 5 pm   [phone]   If you need a medication refill, please contact your pharmacy. Please allow 3 business days for your refill to be completed.  As always, Thank you for trusting us with your healthcare needs!

## 2025-05-13 ENCOUNTER — TELEPHONE (OUTPATIENT)
Dept: OBGYN | Facility: CLINIC | Age: 40
End: 2025-05-13
Payer: COMMERCIAL
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
## 2025-05-15 ENCOUNTER — PRENATAL OFFICE VISIT (OUTPATIENT)
Dept: OBGYN | Facility: CLINIC | Age: 40
End: 2025-05-15
Payer: COMMERCIAL

## 2025-05-15 VITALS
SYSTOLIC BLOOD PRESSURE: 131 MMHG | DIASTOLIC BLOOD PRESSURE: 88 MMHG | BODY MASS INDEX: 24.06 KG/M2 | HEART RATE: 96 BPM | WEIGHT: 177.4 LBS | OXYGEN SATURATION: 100 %

## 2025-05-15 DIAGNOSIS — O09.93 SUPERVISION OF HIGH-RISK PREGNANCY, THIRD TRIMESTER: Primary | ICD-10-CM
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
## 2025-05-30 ENCOUNTER — HOSPITAL ENCOUNTER (OUTPATIENT)
Dept: ULTRASOUND IMAGING | Facility: CLINIC | Age: 40
Discharge: HOME | End: 2025-05-30
Attending: OBSTETRICS & GYNECOLOGY
Payer: COMMERCIAL

## 2025-05-30 DIAGNOSIS — O43.123 VELAMENTOUS INSERTION OF UMBILICAL CORD IN THIRD TRIMESTER: ICD-10-CM

## 2025-05-30 PROCEDURE — 76816 OB US FOLLOW-UP PER FETUS: CPT

## 2025-06-04 ENCOUNTER — RESULTS FOLLOW-UP (OUTPATIENT)
Dept: OBGYN | Facility: CLINIC | Age: 40
End: 2025-06-04

## 2025-07-02 ENCOUNTER — ANCILLARY PROCEDURE (OUTPATIENT)
Facility: CLINIC | Age: 40
End: 2025-07-02
Attending: OBSTETRICS & GYNECOLOGY
Payer: COMMERCIAL

## 2025-07-02 ENCOUNTER — OFFICE VISIT (OUTPATIENT)
Facility: CLINIC | Age: 40
End: 2025-07-02
Attending: OBSTETRICS & GYNECOLOGY
Payer: COMMERCIAL

## 2025-07-02 ENCOUNTER — HOSPITAL ENCOUNTER (INPATIENT)
Facility: CLINIC | Age: 40
End: 2025-07-02
Attending: OBSTETRICS & GYNECOLOGY | Admitting: OBSTETRICS & GYNECOLOGY
Payer: COMMERCIAL

## 2025-07-02 VITALS — SYSTOLIC BLOOD PRESSURE: 137 MMHG | DIASTOLIC BLOOD PRESSURE: 98 MMHG

## 2025-07-02 DIAGNOSIS — O43.123 VELAMENTOUS INSERTION OF UMBILICAL CORD IN THIRD TRIMESTER: Primary | ICD-10-CM

## 2025-07-02 DIAGNOSIS — O43.123 VELAMENTOUS INSERTION OF UMBILICAL CORD IN THIRD TRIMESTER: ICD-10-CM

## 2025-07-02 DIAGNOSIS — O14.12 PREECLAMPSIA, SEVERE, SECOND TRIMESTER: ICD-10-CM

## 2025-07-02 DIAGNOSIS — O09.299 HISTORY OF PRE-ECLAMPSIA IN PRIOR PREGNANCY, CURRENTLY PREGNANT: ICD-10-CM

## 2025-07-02 PROBLEM — O24.414 GESTATIONAL DIABETES MELLITUS (GDM) REQUIRING INSULIN: Status: ACTIVE | Noted: 2025-07-02

## 2025-07-02 PROBLEM — O13.9 GESTATIONAL HYPERTENSION: Status: ACTIVE | Noted: 2025-07-02

## 2025-07-02 LAB
ABO + RH BLD: NORMAL
ALBUMIN MFR UR ELPH: 8.2 MG/DL
ALBUMIN SERPL BCG-MCNC: 3.3 G/DL (ref 3.5–5.2)
ALP SERPL-CCNC: 112 U/L (ref 40–150)
ALT SERPL W P-5'-P-CCNC: 10 U/L (ref 0–50)
ANION GAP SERPL CALCULATED.3IONS-SCNC: 9 MMOL/L (ref 7–15)
AST SERPL W P-5'-P-CCNC: 15 U/L (ref 0–45)
BILIRUB SERPL-MCNC: <0.2 MG/DL
BLD GP AB SCN SERPL QL: NEGATIVE
BUN SERPL-MCNC: 9.2 MG/DL (ref 6–20)
CALCIUM SERPL-MCNC: 12.6 MG/DL (ref 8.8–10.4)
CHLORIDE SERPL-SCNC: 113 MMOL/L (ref 98–107)
CREAT SERPL-MCNC: 0.77 MG/DL (ref 0.51–0.95)
CREAT UR-MCNC: 31.8 MG/DL
EGFRCR SERPLBLD CKD-EPI 2021: >90 ML/MIN/1.73M2
ERYTHROCYTE [DISTWIDTH] IN BLOOD BY AUTOMATED COUNT: 14.1 % (ref 10–15)
GLUCOSE SERPL-MCNC: 86 MG/DL (ref 70–99)
HCO3 SERPL-SCNC: 14 MMOL/L (ref 22–29)
HCT VFR BLD AUTO: 28.4 % (ref 35–47)
HGB BLD-MCNC: 9.3 G/DL (ref 11.7–15.7)
MCH RBC QN AUTO: 31 PG (ref 26.5–33)
MCHC RBC AUTO-ENTMCNC: 32.7 G/DL (ref 31.5–36.5)
MCV RBC AUTO: 95 FL (ref 78–100)
PLATELET # BLD AUTO: 155 10E3/UL (ref 150–450)
POTASSIUM SERPL-SCNC: 4.2 MMOL/L (ref 3.4–5.3)
PROT SERPL-MCNC: 6.3 G/DL (ref 6.4–8.3)
PROT/CREAT 24H UR: 0.26 MG/MG CR (ref 0–0.2)
RBC # BLD AUTO: 3 10E6/UL (ref 3.8–5.2)
SODIUM SERPL-SCNC: 136 MMOL/L (ref 135–145)
SPECIMEN EXP DATE BLD: NORMAL
T PALLIDUM AB SER QL: NONREACTIVE
WBC # BLD AUTO: 11.2 10E3/UL (ref 4–11)

## 2025-07-02 PROCEDURE — 86901 BLOOD TYPING SEROLOGIC RH(D): CPT | Performed by: STUDENT IN AN ORGANIZED HEALTH CARE EDUCATION/TRAINING PROGRAM

## 2025-07-02 PROCEDURE — 250N000013 HC RX MED GY IP 250 OP 250 PS 637: Performed by: STUDENT IN AN ORGANIZED HEALTH CARE EDUCATION/TRAINING PROGRAM

## 2025-07-02 PROCEDURE — 85027 COMPLETE CBC AUTOMATED: CPT | Performed by: OBSTETRICS & GYNECOLOGY

## 2025-07-02 PROCEDURE — G0463 HOSPITAL OUTPT CLINIC VISIT: HCPCS | Mod: 6MC

## 2025-07-02 PROCEDURE — 76819 FETAL BIOPHYS PROFIL W/O NST: CPT | Performed by: STUDENT IN AN ORGANIZED HEALTH CARE EDUCATION/TRAINING PROGRAM

## 2025-07-02 PROCEDURE — 86900 BLOOD TYPING SEROLOGIC ABO: CPT | Performed by: STUDENT IN AN ORGANIZED HEALTH CARE EDUCATION/TRAINING PROGRAM

## 2025-07-02 PROCEDURE — 84156 ASSAY OF PROTEIN URINE: CPT | Performed by: OBSTETRICS & GYNECOLOGY

## 2025-07-02 PROCEDURE — 76816 OB US FOLLOW-UP PER FETUS: CPT | Performed by: STUDENT IN AN ORGANIZED HEALTH CARE EDUCATION/TRAINING PROGRAM

## 2025-07-02 PROCEDURE — 87086 URINE CULTURE/COLONY COUNT: CPT | Performed by: STUDENT IN AN ORGANIZED HEALTH CARE EDUCATION/TRAINING PROGRAM

## 2025-07-02 PROCEDURE — 3E0P7VZ INTRODUCTION OF HORMONE INTO FEMALE REPRODUCTIVE, VIA NATURAL OR ARTIFICIAL OPENING: ICD-10-PCS | Performed by: STUDENT IN AN ORGANIZED HEALTH CARE EDUCATION/TRAINING PROGRAM

## 2025-07-02 PROCEDURE — 80053 COMPREHEN METABOLIC PANEL: CPT | Performed by: OBSTETRICS & GYNECOLOGY

## 2025-07-02 PROCEDURE — 120N000013 HC R&B IMCU

## 2025-07-02 PROCEDURE — 86780 TREPONEMA PALLIDUM: CPT | Performed by: STUDENT IN AN ORGANIZED HEALTH CARE EDUCATION/TRAINING PROGRAM

## 2025-07-02 RX ORDER — MISOPROSTOL 200 UG/1
800 TABLET ORAL
Status: DISCONTINUED | OUTPATIENT
Start: 2025-07-02 | End: 2025-07-02 | Stop reason: HOSPADM

## 2025-07-02 RX ORDER — LOPERAMIDE HYDROCHLORIDE 2 MG/1
2 CAPSULE ORAL
Status: DISCONTINUED | OUTPATIENT
Start: 2025-07-02 | End: 2025-07-02 | Stop reason: HOSPADM

## 2025-07-02 RX ORDER — ONDANSETRON 4 MG/1
4 TABLET, ORALLY DISINTEGRATING ORAL EVERY 6 HOURS PRN
Status: DISCONTINUED | OUTPATIENT
Start: 2025-07-02 | End: 2025-07-02 | Stop reason: HOSPADM

## 2025-07-02 RX ORDER — NICOTINE POLACRILEX 4 MG
15-30 LOZENGE BUCCAL
Status: DISCONTINUED | OUTPATIENT
Start: 2025-07-02 | End: 2025-07-02 | Stop reason: HOSPADM

## 2025-07-02 RX ORDER — IBUPROFEN 400 MG/1
800 TABLET, FILM COATED ORAL
Status: DISCONTINUED | OUTPATIENT
Start: 2025-07-02 | End: 2025-07-03

## 2025-07-02 RX ORDER — KETOROLAC TROMETHAMINE 15 MG/ML
15 INJECTION, SOLUTION INTRAMUSCULAR; INTRAVENOUS
Status: DISCONTINUED | OUTPATIENT
Start: 2025-07-02 | End: 2025-07-03

## 2025-07-02 RX ORDER — METHYLERGONOVINE MALEATE 0.2 MG/ML
200 INJECTION INTRAVENOUS
Status: DISCONTINUED | OUTPATIENT
Start: 2025-07-02 | End: 2025-07-02 | Stop reason: HOSPADM

## 2025-07-02 RX ORDER — BISACODYL 10 MG
10 SUPPOSITORY, RECTAL RECTAL DAILY PRN
Status: DISCONTINUED | OUTPATIENT
Start: 2025-07-02 | End: 2025-07-03

## 2025-07-02 RX ORDER — FENTANYL CITRATE 50 UG/ML
100 INJECTION, SOLUTION INTRAMUSCULAR; INTRAVENOUS
Refills: 0 | Status: DISCONTINUED | OUTPATIENT
Start: 2025-07-02 | End: 2025-07-03 | Stop reason: HOSPADM

## 2025-07-02 RX ORDER — PENICILLIN G 3000000 [IU]/50ML
3 INJECTION, SOLUTION INTRAVENOUS EVERY 4 HOURS
Status: DISCONTINUED | OUTPATIENT
Start: 2025-07-03 | End: 2025-07-03 | Stop reason: HOSPADM

## 2025-07-02 RX ORDER — LOPERAMIDE HYDROCHLORIDE 2 MG/1
4 CAPSULE ORAL
Status: DISCONTINUED | OUTPATIENT
Start: 2025-07-02 | End: 2025-07-02 | Stop reason: HOSPADM

## 2025-07-02 RX ORDER — METOCLOPRAMIDE HYDROCHLORIDE 5 MG/ML
10 INJECTION INTRAMUSCULAR; INTRAVENOUS EVERY 6 HOURS PRN
Status: DISCONTINUED | OUTPATIENT
Start: 2025-07-02 | End: 2025-07-02 | Stop reason: HOSPADM

## 2025-07-02 RX ORDER — OXYTOCIN/0.9 % SODIUM CHLORIDE 30/500 ML
340 PLASTIC BAG, INJECTION (ML) INTRAVENOUS CONTINUOUS PRN
Status: DISCONTINUED | OUTPATIENT
Start: 2025-07-02 | End: 2025-07-02 | Stop reason: HOSPADM

## 2025-07-02 RX ORDER — AMOXICILLIN 250 MG
2 CAPSULE ORAL
Status: DISCONTINUED | OUTPATIENT
Start: 2025-07-02 | End: 2025-07-03

## 2025-07-02 RX ORDER — ONDANSETRON 2 MG/ML
4 INJECTION INTRAMUSCULAR; INTRAVENOUS EVERY 6 HOURS PRN
Status: DISCONTINUED | OUTPATIENT
Start: 2025-07-02 | End: 2025-07-02 | Stop reason: HOSPADM

## 2025-07-02 RX ORDER — ACETAMINOPHEN 325 MG/1
650 TABLET ORAL EVERY 4 HOURS PRN
Status: DISCONTINUED | OUTPATIENT
Start: 2025-07-02 | End: 2025-07-03 | Stop reason: HOSPADM

## 2025-07-02 RX ORDER — TRANEXAMIC ACID 10 MG/ML
1 INJECTION, SOLUTION INTRAVENOUS EVERY 30 MIN PRN
Status: DISCONTINUED | OUTPATIENT
Start: 2025-07-02 | End: 2025-07-02 | Stop reason: HOSPADM

## 2025-07-02 RX ORDER — TERBUTALINE SULFATE 1 MG/ML
0.25 INJECTION SUBCUTANEOUS
Status: DISCONTINUED | OUTPATIENT
Start: 2025-07-02 | End: 2025-07-02 | Stop reason: HOSPADM

## 2025-07-02 RX ORDER — MISOPROSTOL 200 UG/1
400 TABLET ORAL
Status: DISCONTINUED | OUTPATIENT
Start: 2025-07-02 | End: 2025-07-02 | Stop reason: HOSPADM

## 2025-07-02 RX ORDER — NICOTINE POLACRILEX 4 MG
15-30 LOZENGE BUCCAL
Status: ACTIVE | OUTPATIENT
Start: 2025-07-02

## 2025-07-02 RX ORDER — PROCHLORPERAZINE MALEATE 5 MG/1
10 TABLET ORAL EVERY 6 HOURS PRN
Status: DISCONTINUED | OUTPATIENT
Start: 2025-07-02 | End: 2025-07-02 | Stop reason: HOSPADM

## 2025-07-02 RX ORDER — OXYTOCIN 10 [USP'U]/ML
10 INJECTION, SOLUTION INTRAMUSCULAR; INTRAVENOUS
Status: DISCONTINUED | OUTPATIENT
Start: 2025-07-02 | End: 2025-07-02 | Stop reason: HOSPADM

## 2025-07-02 RX ORDER — LIDOCAINE 40 MG/G
CREAM TOPICAL
Status: ACTIVE | OUTPATIENT
Start: 2025-07-02

## 2025-07-02 RX ORDER — HYDRALAZINE HYDROCHLORIDE 20 MG/ML
10 INJECTION INTRAMUSCULAR; INTRAVENOUS
Status: ACTIVE | OUTPATIENT
Start: 2025-07-02

## 2025-07-02 RX ORDER — LABETALOL HYDROCHLORIDE 5 MG/ML
20-80 INJECTION, SOLUTION INTRAVENOUS EVERY 10 MIN PRN
Status: DISPENSED | OUTPATIENT
Start: 2025-07-02

## 2025-07-02 RX ORDER — PENICILLIN G POTASSIUM 5000000 [IU]/1
5 INJECTION, POWDER, FOR SOLUTION INTRAMUSCULAR; INTRAVENOUS ONCE
Status: COMPLETED | OUTPATIENT
Start: 2025-07-02 | End: 2025-07-03

## 2025-07-02 RX ORDER — LIDOCAINE 40 MG/G
CREAM TOPICAL
Status: DISCONTINUED | OUTPATIENT
Start: 2025-07-02 | End: 2025-07-03

## 2025-07-02 RX ORDER — DEXTROSE MONOHYDRATE 25 G/50ML
25-50 INJECTION, SOLUTION INTRAVENOUS
Status: ACTIVE | OUTPATIENT
Start: 2025-07-02

## 2025-07-02 RX ORDER — OXYTOCIN 10 [USP'U]/ML
10 INJECTION, SOLUTION INTRAMUSCULAR; INTRAVENOUS
Status: ACTIVE | OUTPATIENT
Start: 2025-07-02

## 2025-07-02 RX ORDER — DEXTROSE MONOHYDRATE 25 G/50ML
25-50 INJECTION, SOLUTION INTRAVENOUS
Status: DISCONTINUED | OUTPATIENT
Start: 2025-07-02 | End: 2025-07-02 | Stop reason: HOSPADM

## 2025-07-02 RX ORDER — POLYETHYLENE GLYCOL 3350 17 G/17G
17 POWDER, FOR SOLUTION ORAL DAILY PRN
Status: DISCONTINUED | OUTPATIENT
Start: 2025-07-02 | End: 2025-07-03

## 2025-07-02 RX ORDER — METOCLOPRAMIDE 10 MG/1
10 TABLET ORAL EVERY 6 HOURS PRN
Status: DISCONTINUED | OUTPATIENT
Start: 2025-07-02 | End: 2025-07-02 | Stop reason: HOSPADM

## 2025-07-02 RX ORDER — CARBOPROST TROMETHAMINE 250 UG/ML
250 INJECTION, SOLUTION INTRAMUSCULAR
Status: DISCONTINUED | OUTPATIENT
Start: 2025-07-02 | End: 2025-07-02 | Stop reason: HOSPADM

## 2025-07-02 RX ORDER — OXYTOCIN/0.9 % SODIUM CHLORIDE 30/500 ML
100-340 PLASTIC BAG, INJECTION (ML) INTRAVENOUS CONTINUOUS PRN
Status: DISPENSED | OUTPATIENT
Start: 2025-07-02

## 2025-07-02 RX ORDER — NIFEDIPINE 30 MG/1
30 TABLET, EXTENDED RELEASE ORAL DAILY
Status: DISCONTINUED | OUTPATIENT
Start: 2025-07-02 | End: 2025-07-03

## 2025-07-02 RX ORDER — SODIUM CHLORIDE, SODIUM LACTATE, POTASSIUM CHLORIDE, CALCIUM CHLORIDE 600; 310; 30; 20 MG/100ML; MG/100ML; MG/100ML; MG/100ML
10-125 INJECTION, SOLUTION INTRAVENOUS CONTINUOUS
Status: DISCONTINUED | OUTPATIENT
Start: 2025-07-02 | End: 2025-07-03

## 2025-07-02 RX ORDER — CITRIC ACID/SODIUM CITRATE 334-500MG
30 SOLUTION, ORAL ORAL
Status: DISCONTINUED | OUTPATIENT
Start: 2025-07-02 | End: 2025-07-02 | Stop reason: HOSPADM

## 2025-07-02 RX ORDER — MISOPROSTOL 100 UG/1
25 TABLET ORAL EVERY 4 HOURS
Status: DISCONTINUED | OUTPATIENT
Start: 2025-07-03 | End: 2025-07-03 | Stop reason: HOSPADM

## 2025-07-02 RX ADMIN — NIFEDIPINE 30 MG: 30 TABLET, FILM COATED, EXTENDED RELEASE ORAL at 17:37

## 2025-07-02 ASSESSMENT — ACTIVITIES OF DAILY LIVING (ADL)
ADLS_ACUITY_SCORE: 18
ADLS_ACUITY_SCORE: 18
ADLS_ACUITY_SCORE: 41
ADLS_ACUITY_SCORE: 18

## 2025-07-02 NOTE — PROGRESS NOTES
Data: Patient admitted to room 212 at 1638. Patient is a . Gestational age 36w6d. Prenatal record reviewed. Vital signs per doc flowsheet. Fetal movement present. Support person Aidan present.  Action: Admission assessment completed. Patient and support person educated on plan of care. Patient instructed to report change in fetal movement, contractions, vaginal leaking of fluid or bleeding, abdominal pain, or any concerns related to the pregnancy to her nurse/physician. Patient oriented to room, call light in reach.   Response: Plan per provider is monitor bp's. Induction will begin tonight midnight (37w gestation) unless patient has severe blood pressures. Patient verbalized understanding of education and verbalized agreement with plan.

## 2025-07-02 NOTE — PROGRESS NOTES
Data: [facility]: 2025  1:37 PM.  Reason for maternal/fetal assessment is abdominal and lower back pain. Patient also reports having a headache for the last hour. Patient denies leaking of vaginal fluid/rupture of membranes, vaginal bleeding. Patient reports fetal movement is normal. Patient is a 36w6d .  Prenatal record reviewed. Patient history reviewed.     Vital signs abnormal, see flowsheet. Support person is present.     Action: Verbal consent for EFM. Triage assessment completed.     Response: Patient verbalized agreement with plan. Orders placed per Dr Stiles via telephone for labs.

## 2025-07-02 NOTE — PROVIDER NOTIFICATION
07/02/25 1523   Provider Notification   Provider Name/Title Dr Sanchez   Method of Notification Phone   Request Evaluate in Person   Notification Reason Patient Arrived;Uterine Activity;Pain;Lab/Diagnostic Study;Maternal Vital Sign Change     Dr Sanchez updated on patient arrival. Bp's elevated. Patient feeling a severe headache.Patient also complaining of intermittent cramping. Denies LOF or bleeding.  Reviewed labs and VS with MD. Dr Sanchez en route to discuss POC with patient.

## 2025-07-02 NOTE — H&P
[facility]  OB History and Physical    Homero Landry MRN# [id number]   Age: 39 year old YOB: 1985     CC:  Elevated BP    HPI:  Ms. Homero Landry is a 39 year old  at 36w6d by LMP c/w 7w2d US, who presents with elevated BP noted at [facility] growth US for velamentous cord insert.  She also states she has HA, mild SOB, one episode of chest pain, some LE swelling, contractions that started today.  Denies vaginal bleeding, and loss of fluid.   + normal fetal movement.  Denies symptoms of  vision changes, fevers, chills, palpitations, nausea, vomiting, RUQ pain, dysuria, constipation, diarrhea.     Conditions affecting pregnancy  - gHTN, hx of preE in prior pregnancies  - Velamentous cord insert  - GBS positive UCx  - Anemia  - AMA  - Sickle cell trait  - Failed GCT, passed GTT    Prenatal Labs:   Lab Results   Component Value Date    AS Negative 2025    HEPBANG Nonreactive 2025    CHPCRT Negative 2025    GCPCRT Negative 2025    RUQIGG Positive 2025    HGB 9.3 (L) 2025       GBS Status:   pos      OB History  OB History    Para Term  AB Living   5 2 2 0 2 2   SAB IAB Ectopic Multiple Live Births   1 0 0 0 2      # Outcome Date GA Lbr Cristino/2nd Weight Sex Type Anes PTL Lv   5 Current            4 AB 21           3 Term 10/06/16    F Vag-Spont   CHELSIE      Complications: Preeclampsia/Hypertension   2 Term 12    M Vag-Spont  N CHELSIE      Birth Comments: System Generated. Please review and update pregnancy details.      Complications: Preeclampsia/Hypertension   1 SAB      SAB          PMHx:   Past Medical History:   Diagnosis Date    Anemia     Family history of Downs syndrome     niece    Sickle cell trait      PSHx:   Past Surgical History:   Procedure Laterality Date    NO HISTORY OF SURGERY       Meds:   Medications Prior to Admission   Medication Sig Dispense Refill Last Dose/Taking    aspirin (ASA) 81 MG chewable tablet Take 81  mg by mouth daily.   Past Week    ferrous sulfate (FEROSUL) 325 (65 Fe) MG tablet Take 1 tablet (325 mg) by mouth 2 times daily. 90 tablet 4 Past Month    Prenatal Vit-Fe Fumarate-FA (PRENATAL MULTIVITAMIN W/IRON) 27-0.8 MG tablet Take 1 tablet by mouth daily. 90 tablet 3 7/1/2025    amoxicillin (AMOXIL) 500 MG capsule Take 1 capsule (500 mg) by mouth every 6 hours. (Patient not taking: Reported on 5/15/2025) 40 capsule 0     metoclopramide (REGLAN) 10 MG tablet Take 1 tablet (10 mg) by mouth 4 times daily as needed for nausea. 40 tablet 1     nitroFURantoin macrocrystal-monohydrate (MACROBID) 100 MG capsule Take 1 capsule (100 mg) by mouth 2 times daily. (Patient not taking: Reported on 5/15/2025) 14 capsule 0     ondansetron (ZOFRAN ODT) 4 MG ODT tab Take 1 tablet (4 mg) by mouth every 6 hours as needed for nausea or vomiting. (Patient not taking: Reported on 5/15/2025) 30 tablet 1     ondansetron (ZOFRAN ODT) 4 MG ODT tab Place 2 tablets (8 mg) under the tongue every 6 hours as needed for nausea. (Patient not taking: Reported on 5/15/2025) 20 tablet 0     Prenatal Vit-Fe Fumarate-FA (PRENATAL MULTIVITAMIN  PLUS IRON) 27-1 MG TABS Take 1 tablet by mouth daily. (Patient not taking: Reported on 4/22/2025) 100 tablet 1      Allergies:  No Known Allergies   FmHx:   Family History   Problem Relation Age of Onset    Hypertension Mother     Cancer Mother     Kidney failure Mother     No Known Problems Father     Cancer Son        PE:  Vit: Patient Vitals for the past 4 hrs:   BP Temp Temp src Resp SpO2   07/02/25 1615 (!) 152/96 -- -- -- --   07/02/25 1600 (!) 152/95 -- -- -- --   07/02/25 1530 (!) 155/101 -- -- -- 100 %   07/02/25 1515 (!) 162/100 -- -- -- 100 %   07/02/25 1510 (!) 133/100 -- -- -- --   07/02/25 1455 (!) 148/101 -- -- -- 100 %   07/02/25 1440 (!) 155/103 97.9  F (36.6  C) Oral 18 100 %   07/02/25 1425 (!) 155/102 -- -- -- 100 %   07/02/25 1410 (!) 155/103 -- -- -- 100 %      Gen: Well-appearing, NAD,  comfortable   CV: Regular rate  Pulm: Breathing comfortably on RA  Abd: Soft, gravid, non-tender  Ext: 1+ edema    Pres:  Ceph on MFM US  EFW:  40%ile on MFM US today   Memb: intact              FHT: Baseline 120bpm, moderate variability, + accelerations, no decelerations   Albee: irregular contractions       Assessment  Ms. Homero Landry is a 39 year old , at 36w6d who presents with gHTN,with single SR BP.    Plan  - Labor   - Admit to L&D for IOL at midnight or sooner if meeting criteria for preE w/ SF.    - PNC:     - Rh positive. Rubella immune. GBS positive. Start PCN.    - Fen/GI: Regular diet in early labor   - Pain management: per patient preference   - Anticipate    - Plan: pending cervical exam will initiate ripening or pitocin.     -gHTN, r/o preE w/ SF   - SR BP x 1, persistent MR.   - Meds: Start PO procardia 30mg daily. IV antihypertensive meds prn for sustained SR BPs. Start IV magnesium if meeting criteria for preE w/ SF  -Symptoms: Monitor HA, SOB  - Labs wnl  - Continue to monitor   - start IOL at midnight or sooner if meeting criteria for preE w/ SF    - Hx of UTI   - Recheck UCx today        - Failed GCT, no GTT.    - A1c ordered, evaluate as if GDMA1 in early and active labor with finger stick BG    -Fetal Well Being   - Category I FHT. Reactive and reassuring   - Continue EFM and Albee    Cora Sanchez MD, MHS  OB/GYN  2025

## 2025-07-02 NOTE — NURSING NOTE
Homero Landry is a  at 36w6d who presents to [facility] for RL2/BPP. Pt reports positive fetal movement. Pt denies bldg/lof/change in discharge, contractions, headache, vision changes, chest pain/SOB or edema. SBAR given to Dr. JAMIE Amor, see note in Epic.     Patient last OB appt in May. Today's /92, 137/98. Triage evaluation recommended by Dr. JAMIE Amor. Patient now wants to deliver at [facility].  [facility] L&D charge RN called. Unit is open and able to see patient in triage.  Dr. Tierney given SBAR by Dr. JAMIE Amor.

## 2025-07-02 NOTE — PROGRESS NOTES
The patient was seen for an ultrasound in the [facility] today.  For a detailed report of the ultrasound examination, please see the ultrasound report which can be found under the imaging tab.     If you have questions regarding today's evaluation or if we can be of further service, please contact the Maternal-Fetal Medicine Center.    Roxana Amor MD  Maternal Fetal Medicine

## 2025-07-03 VITALS
OXYGEN SATURATION: 100 % | DIASTOLIC BLOOD PRESSURE: 81 MMHG | WEIGHT: 188.05 LBS | SYSTOLIC BLOOD PRESSURE: 123 MMHG | HEART RATE: 78 BPM | HEIGHT: 72 IN | RESPIRATION RATE: 16 BRPM | TEMPERATURE: 97.6 F | BODY MASS INDEX: 25.47 KG/M2

## 2025-07-03 LAB
BACTERIA UR CULT: NORMAL
GLUCOSE BLDC GLUCOMTR-MCNC: 83 MG/DL (ref 70–99)

## 2025-07-03 PROCEDURE — 250N000013 HC RX MED GY IP 250 OP 250 PS 637: Performed by: STUDENT IN AN ORGANIZED HEALTH CARE EDUCATION/TRAINING PROGRAM

## 2025-07-03 PROCEDURE — 250N000013 HC RX MED GY IP 250 OP 250 PS 637: Performed by: OBSTETRICS & GYNECOLOGY

## 2025-07-03 PROCEDURE — 250N000011 HC RX IP 250 OP 636: Performed by: ANESTHESIOLOGY

## 2025-07-03 PROCEDURE — 3E0R3BZ INTRODUCTION OF ANESTHETIC AGENT INTO SPINAL CANAL, PERCUTANEOUS APPROACH: ICD-10-PCS | Performed by: STUDENT IN AN ORGANIZED HEALTH CARE EDUCATION/TRAINING PROGRAM

## 2025-07-03 PROCEDURE — 0HQ9XZZ REPAIR PERINEUM SKIN, EXTERNAL APPROACH: ICD-10-PCS | Performed by: STUDENT IN AN ORGANIZED HEALTH CARE EDUCATION/TRAINING PROGRAM

## 2025-07-03 PROCEDURE — 722N000001 HC LABOR CARE VAGINAL DELIVERY SINGLE

## 2025-07-03 PROCEDURE — 258N000003 HC RX IP 258 OP 636: Performed by: STUDENT IN AN ORGANIZED HEALTH CARE EDUCATION/TRAINING PROGRAM

## 2025-07-03 PROCEDURE — 250N000011 HC RX IP 250 OP 636: Performed by: OBSTETRICS & GYNECOLOGY

## 2025-07-03 PROCEDURE — 250N000009 HC RX 250: Performed by: STUDENT IN AN ORGANIZED HEALTH CARE EDUCATION/TRAINING PROGRAM

## 2025-07-03 PROCEDURE — 250N000011 HC RX IP 250 OP 636: Performed by: STUDENT IN AN ORGANIZED HEALTH CARE EDUCATION/TRAINING PROGRAM

## 2025-07-03 PROCEDURE — 258N000003 HC RX IP 258 OP 636: Performed by: OBSTETRICS & GYNECOLOGY

## 2025-07-03 PROCEDURE — 59400 OBSTETRICAL CARE: CPT | Performed by: STUDENT IN AN ORGANIZED HEALTH CARE EDUCATION/TRAINING PROGRAM

## 2025-07-03 PROCEDURE — 00HU33Z INSERTION OF INFUSION DEVICE INTO SPINAL CANAL, PERCUTANEOUS APPROACH: ICD-10-PCS | Performed by: STUDENT IN AN ORGANIZED HEALTH CARE EDUCATION/TRAINING PROGRAM

## 2025-07-03 PROCEDURE — 120N000012 HC R&B POSTPARTUM

## 2025-07-03 PROCEDURE — 10907ZC DRAINAGE OF AMNIOTIC FLUID, THERAPEUTIC FROM PRODUCTS OF CONCEPTION, VIA NATURAL OR ARTIFICIAL OPENING: ICD-10-PCS | Performed by: OBSTETRICS & GYNECOLOGY

## 2025-07-03 RX ORDER — MISOPROSTOL 200 UG/1
800 TABLET ORAL
Status: ACTIVE | OUTPATIENT
Start: 2025-07-03

## 2025-07-03 RX ORDER — ONDANSETRON 2 MG/ML
4 INJECTION INTRAMUSCULAR; INTRAVENOUS EVERY 6 HOURS PRN
Status: DISCONTINUED | OUTPATIENT
Start: 2025-07-03 | End: 2025-07-03 | Stop reason: HOSPADM

## 2025-07-03 RX ORDER — CARBOPROST TROMETHAMINE 250 UG/ML
250 INJECTION, SOLUTION INTRAMUSCULAR
Status: ACTIVE | OUTPATIENT
Start: 2025-07-03

## 2025-07-03 RX ORDER — MAGNESIUM SULFATE IN WATER 40 MG/ML
2 INJECTION, SOLUTION INTRAVENOUS CONTINUOUS
Status: DISPENSED | OUTPATIENT
Start: 2025-07-03

## 2025-07-03 RX ORDER — LOPERAMIDE HYDROCHLORIDE 2 MG/1
2 CAPSULE ORAL
Status: ACTIVE | OUTPATIENT
Start: 2025-07-03

## 2025-07-03 RX ORDER — TERBUTALINE SULFATE 1 MG/ML
0.25 INJECTION SUBCUTANEOUS ONCE
Status: COMPLETED | OUTPATIENT
Start: 2025-07-03 | End: 2025-07-03

## 2025-07-03 RX ORDER — POLYETHYLENE GLYCOL 3350 17 G/17G
17 POWDER, FOR SOLUTION ORAL DAILY PRN
Status: ACTIVE | OUTPATIENT
Start: 2025-07-03

## 2025-07-03 RX ORDER — NALBUPHINE HYDROCHLORIDE 10 MG/ML
2.5-5 INJECTION INTRAMUSCULAR; INTRAVENOUS; SUBCUTANEOUS EVERY 6 HOURS PRN
Status: ACTIVE | OUTPATIENT
Start: 2025-07-03

## 2025-07-03 RX ORDER — OXYTOCIN 10 [USP'U]/ML
10 INJECTION, SOLUTION INTRAMUSCULAR; INTRAVENOUS
Status: ACTIVE | OUTPATIENT
Start: 2025-07-03

## 2025-07-03 RX ORDER — MAGNESIUM SULFATE HEPTAHYDRATE 40 MG/ML
4 INJECTION, SOLUTION INTRAVENOUS
Status: ACTIVE | OUTPATIENT
Start: 2025-07-03

## 2025-07-03 RX ORDER — AMOXICILLIN 250 MG
2 CAPSULE ORAL
Status: ACTIVE | OUTPATIENT
Start: 2025-07-03

## 2025-07-03 RX ORDER — METHYLERGONOVINE MALEATE 0.2 MG/ML
200 INJECTION INTRAVENOUS
Status: ACTIVE | OUTPATIENT
Start: 2025-07-03

## 2025-07-03 RX ORDER — ACETAMINOPHEN 325 MG/1
650 TABLET ORAL EVERY 4 HOURS PRN
Status: DISPENSED | OUTPATIENT
Start: 2025-07-03

## 2025-07-03 RX ORDER — TERBUTALINE SULFATE 1 MG/ML
INJECTION SUBCUTANEOUS
Status: COMPLETED
Start: 2025-07-03 | End: 2025-07-03

## 2025-07-03 RX ORDER — SODIUM CHLORIDE, SODIUM LACTATE, POTASSIUM CHLORIDE, CALCIUM CHLORIDE 600; 310; 30; 20 MG/100ML; MG/100ML; MG/100ML; MG/100ML
10-125 INJECTION, SOLUTION INTRAVENOUS CONTINUOUS
Status: ACTIVE | OUTPATIENT
Start: 2025-07-03

## 2025-07-03 RX ORDER — IBUPROFEN 400 MG/1
800 TABLET, FILM COATED ORAL EVERY 6 HOURS PRN
Status: DISPENSED | OUTPATIENT
Start: 2025-07-03

## 2025-07-03 RX ORDER — MAGNESIUM SULFATE HEPTAHYDRATE 40 MG/ML
2 INJECTION, SOLUTION INTRAVENOUS
Status: ACTIVE | OUTPATIENT
Start: 2025-07-03

## 2025-07-03 RX ORDER — MISOPROSTOL 200 UG/1
400 TABLET ORAL
Status: ACTIVE | OUTPATIENT
Start: 2025-07-03

## 2025-07-03 RX ORDER — LOPERAMIDE HYDROCHLORIDE 2 MG/1
4 CAPSULE ORAL
Status: ACTIVE | OUTPATIENT
Start: 2025-07-03

## 2025-07-03 RX ORDER — HYDROCORTISONE 25 MG/G
CREAM TOPICAL 3 TIMES DAILY PRN
Status: ACTIVE | OUTPATIENT
Start: 2025-07-03

## 2025-07-03 RX ORDER — CALCIUM GLUCONATE 98 MG/ML
1 INJECTION, SOLUTION INTRAVENOUS
Status: ACTIVE | OUTPATIENT
Start: 2025-07-03

## 2025-07-03 RX ORDER — ROPIVACAINE HYDROCHLORIDE 2 MG/ML
10 INJECTION, SOLUTION EPIDURAL; INFILTRATION; PERINEURAL ONCE
Status: DISCONTINUED | OUTPATIENT
Start: 2025-07-03 | End: 2025-07-03 | Stop reason: HOSPADM

## 2025-07-03 RX ORDER — LABETALOL 200 MG/1
200 TABLET, FILM COATED ORAL EVERY 12 HOURS SCHEDULED
Status: DISPENSED | OUTPATIENT
Start: 2025-07-03

## 2025-07-03 RX ORDER — EPHEDRINE SULFATE 50 MG/ML
5 INJECTION, SOLUTION INTRAMUSCULAR; INTRAVENOUS; SUBCUTANEOUS
Status: DISCONTINUED | OUTPATIENT
Start: 2025-07-03 | End: 2025-07-03 | Stop reason: HOSPADM

## 2025-07-03 RX ORDER — MAGNESIUM SULFATE HEPTAHYDRATE 40 MG/ML
4 INJECTION, SOLUTION INTRAVENOUS ONCE
Status: COMPLETED | OUTPATIENT
Start: 2025-07-03 | End: 2025-07-03

## 2025-07-03 RX ORDER — BISACODYL 10 MG
10 SUPPOSITORY, RECTAL RECTAL DAILY PRN
Status: ACTIVE | OUTPATIENT
Start: 2025-07-03

## 2025-07-03 RX ORDER — NIFEDIPINE 30 MG/1
30 TABLET, EXTENDED RELEASE ORAL DAILY
Status: DISCONTINUED | OUTPATIENT
Start: 2025-07-03 | End: 2025-07-03

## 2025-07-03 RX ORDER — TRANEXAMIC ACID 10 MG/ML
1 INJECTION, SOLUTION INTRAVENOUS EVERY 30 MIN PRN
Status: ACTIVE | OUTPATIENT
Start: 2025-07-03

## 2025-07-03 RX ORDER — ONDANSETRON 4 MG/1
4 TABLET, ORALLY DISINTEGRATING ORAL EVERY 6 HOURS PRN
Status: DISCONTINUED | OUTPATIENT
Start: 2025-07-03 | End: 2025-07-03 | Stop reason: HOSPADM

## 2025-07-03 RX ORDER — OXYTOCIN/0.9 % SODIUM CHLORIDE 30/500 ML
340 PLASTIC BAG, INJECTION (ML) INTRAVENOUS CONTINUOUS PRN
Status: ACTIVE | OUTPATIENT
Start: 2025-07-03

## 2025-07-03 RX ADMIN — Medication: at 03:58

## 2025-07-03 RX ADMIN — ACETAMINOPHEN 650 MG: 325 TABLET, FILM COATED ORAL at 20:39

## 2025-07-03 RX ADMIN — LABETALOL HYDROCHLORIDE 200 MG: 200 TABLET, FILM COATED ORAL at 19:48

## 2025-07-03 RX ADMIN — LABETALOL HYDROCHLORIDE 40 MG: 5 INJECTION INTRAVENOUS at 08:27

## 2025-07-03 RX ADMIN — Medication: at 09:23

## 2025-07-03 RX ADMIN — MAGNESIUM SULFATE HEPTAHYDRATE 2 G/HR: 40 INJECTION, SOLUTION INTRAVENOUS at 19:21

## 2025-07-03 RX ADMIN — LABETALOL HYDROCHLORIDE 80 MG: 5 INJECTION INTRAVENOUS at 08:47

## 2025-07-03 RX ADMIN — MAGNESIUM SULFATE HEPTAHYDRATE 4 G: 40 INJECTION, SOLUTION INTRAVENOUS at 09:24

## 2025-07-03 RX ADMIN — PENICILLIN G 3 MILLION UNITS: 3000000 INJECTION, SOLUTION INTRAVENOUS at 11:03

## 2025-07-03 RX ADMIN — IBUPROFEN 800 MG: 400 TABLET ORAL at 20:39

## 2025-07-03 RX ADMIN — MAGNESIUM SULFATE HEPTAHYDRATE 2 G/HR: 40 INJECTION, SOLUTION INTRAVENOUS at 09:55

## 2025-07-03 RX ADMIN — ACETAMINOPHEN 650 MG: 325 TABLET, FILM COATED ORAL at 07:38

## 2025-07-03 RX ADMIN — ACETAMINOPHEN 650 MG: 325 TABLET, FILM COATED ORAL at 16:38

## 2025-07-03 RX ADMIN — Medication 340 ML/HR: at 11:52

## 2025-07-03 RX ADMIN — MISOPROSTOL 25 MCG: 100 TABLET ORAL at 00:01

## 2025-07-03 RX ADMIN — PENICILLIN G 3 MILLION UNITS: 3000000 INJECTION, SOLUTION INTRAVENOUS at 06:54

## 2025-07-03 RX ADMIN — SODIUM CHLORIDE, POTASSIUM CHLORIDE, SODIUM LACTATE AND CALCIUM CHLORIDE 100 ML/HR: 600; 310; 30; 20 INJECTION, SOLUTION INTRAVENOUS at 08:34

## 2025-07-03 RX ADMIN — TERBUTALINE SULFATE 0.25 MG: 1 INJECTION, SOLUTION SUBCUTANEOUS at 01:55

## 2025-07-03 RX ADMIN — PENICILLIN G POTASSIUM 5 MILLION UNITS: 5000000 POWDER, FOR SOLUTION INTRAMUSCULAR; INTRAPLEURAL; INTRATHECAL; INTRAVENOUS at 03:00

## 2025-07-03 RX ADMIN — SODIUM CHLORIDE, SODIUM LACTATE, POTASSIUM CHLORIDE, AND CALCIUM CHLORIDE 250 ML: .6; .31; .03; .02 INJECTION, SOLUTION INTRAVENOUS at 01:31

## 2025-07-03 RX ADMIN — LABETALOL HYDROCHLORIDE 20 MG: 5 INJECTION INTRAVENOUS at 08:10

## 2025-07-03 RX ADMIN — TERBUTALINE SULFATE 0.25 MG: 1 INJECTION SUBCUTANEOUS at 01:55

## 2025-07-03 RX ADMIN — SODIUM CHLORIDE, SODIUM LACTATE, POTASSIUM CHLORIDE, AND CALCIUM CHLORIDE 75 ML/HR: .6; .31; .03; .02 INJECTION, SOLUTION INTRAVENOUS at 19:21

## 2025-07-03 RX ADMIN — SODIUM CHLORIDE, POTASSIUM CHLORIDE, SODIUM LACTATE AND CALCIUM CHLORIDE 100 ML/HR: 600; 310; 30; 20 INJECTION, SOLUTION INTRAVENOUS at 01:25

## 2025-07-03 ASSESSMENT — ACTIVITIES OF DAILY LIVING (ADL)
ADLS_ACUITY_SCORE: 24
ADLS_ACUITY_SCORE: 18
ADLS_ACUITY_SCORE: 24
ADLS_ACUITY_SCORE: 23
ADLS_ACUITY_SCORE: 23
ADLS_ACUITY_SCORE: 18
ADLS_ACUITY_SCORE: 23
ADLS_ACUITY_SCORE: 18
ADLS_ACUITY_SCORE: 23
ADLS_ACUITY_SCORE: 24
ADLS_ACUITY_SCORE: 18
ADLS_ACUITY_SCORE: 23
ADLS_ACUITY_SCORE: 18
ADLS_ACUITY_SCORE: 18
ADLS_ACUITY_SCORE: 24
ADLS_ACUITY_SCORE: 18

## 2025-07-03 NOTE — PLAN OF CARE
Goal Outcome Evaluation:         0725:  Dr. Stiles at bedside. Pt very uncomfortable with a headache.  SVE by MD with AROM mod amount clear fluid.    0738:  Tylenol given for headache.  0807: Dr. Gonzales updated on severe range BPs and will be treating.  Pt very uncomfortable with contractions and feels epidural no longer working.  Will call for rebolus.  0810:  Labetalol 20 mg IVP  0815: MDA at bedside for rebolus  0827:  Labetalol 40 mg IVP  0847:  Labetalol 80mg IVP.  SVE by RN.  0850:  Dr. Gonzales at bedside.  Plans to order Magnesium.  0903:  MDA at bedside as rebolus didn't help.  Plan to replace epidural.   BPs no longer in severe range.  0924: Mag 4 gm load.  Pt now comfortable.  0955:  Mag 2gm/hr

## 2025-07-03 NOTE — PROVIDER NOTIFICATION
07/03/25 0439   Provider Notification   Provider Name/Title Dr. Stiles   Method of Notification Electronic Page   Request Evaluate - Remote   Notification Reason Status Update     Vocera text sent to Dr. Stiles as follows: update on [facility]: patient requested epidural. Now comfortable with epidural. contractions every 3-4 still. SVE 2/60/-2 midposition. have not given next dose of cytotec as was getting an epidural and has made change. fetal tracing since terbutaline is minimal variability but with accelerations and no decels. blood pressure stable so far during shift.      Dr. Stiles returned page. Continue to monitor at current rate. Ok to hold vaginal cytotec at this time.

## 2025-07-03 NOTE — PROGRESS NOTES
Data: Report received from Kade CROWELL. Care assumed at 1915. Patient is a . Prenatal record reviewed.   OB History    Para Term  AB Living   5 2 2 0 2 2   SAB IAB Ectopic Multiple Live Births   1 0 0 0 2      # Outcome Date GA Lbr Cristino/2nd Weight Sex Type Anes PTL Lv   5 Current            4 AB 21           3 Term 10/06/16    F Vag-Spont   CHELSIE      Complications: Preeclampsia/Hypertension   2 Term 12    M Vag-Spont  N CHELSIE      Birth Comments: System Generated. Please review and update pregnancy details.      Complications: Preeclampsia/Hypertension   1 SAB      SAB      .  Medical History:   Past Medical History:   Diagnosis Date    Anemia     Family history of Downs syndrome     niece    Sickle cell trait    .  Gestational age 36w6d. Vital signs per doc flowsheet. Fetal movement present. Support persons Aidan present.  Action: Verbal consent for EFM, external fetal monitors applied. Admission assessment completed. Patient and support persons educated on labor process. Patient instructed to report change in fetal movement, contractions, vaginal leaking of fluid or bleeding, abdominal pain, or any concerns related to the pregnancy to her nurse/physician. Patient oriented to room, call light in reach.   Response: [facility] primary obstetrician. Plan per provider is vaginal cytotec at midnight if no severe pressures before then. Patient verbalized understanding of education and verbalized agreement with plan. Patient coping with labor via epidural if requested.

## 2025-07-03 NOTE — PROGRESS NOTES
0338 [facility] paged for epidural    0339 Dr. Neal returned page. Will put orders in for epidural.    0343 Dr. Neal in room. Patient sitting at edge of bed.    0355 Time out performed.    0357 Epidural catheter placed.    0401 Test dose administered.    0405 Clinician bolus given by Dr. Neal.    0407 Patient positioned in left tilt.    0409 Continuous started. Dr. Neal out of room.

## 2025-07-03 NOTE — L&D DELIVERY NOTE
VAGINAL DELIVERY PROCEDURE NOTE    PREOPERATIVE DIAGNOSIS:  1. Intrauterine pregnancy at 37w 0d  2. Active labor  3. Preeclampsia with Severe Features  4. Velamentous cord insertion  5. GBS positive  6. AMA  7. Sickle Cell Trait    POSTOPERATIVE DIAGNOSIS:   1. Status post spontaneous vaginal delivery  2. 1st degree laceraction    PROCEDURE DATE: July 3, 2025    PROCEDURE:   1. Spontaneous vaginal delivery  2. 1st degree Laceration repair      STAFF SURGEON: Alesha Gonzales MD     ANESTHESIA: epidural    COMPLICATIONS: none    ESTIMATED BLOOD LOSS: 25 mL.     SPECIMENS: none    FINDINGS:  Male infant weighing 5 lbs 12 oz. Apgar scores of 8 and 9.  Delivered in vertex OA presentation. Placenta with 3  vessel cord. Meconium: none.      INDICATIONS:  This is a 39 year old  with intrauterine pregnancy at 37w0d who presented to Labor and Delivery for elevated blood pressures. Her pregnancy has been complicated by velamentous cord insert, GDMA1, hx preeclampsia, AMA, anemia, sickle cell trait. She was admitted for [facility] and met criteria for preeclampsia with severe features during her induction due to severe range BPs.    PROCEDURE:  The patient was complete and pushing. Infant's head was delivered atraumatically. Nuchal cord not seen. Anterior shoulder and posterior shoulders were easily delivered without problems followed by remainder of infant. Male infant vigorous and crying. The infant was placed skin to skin; drying and resuscitative measures performed. Delayed cord clamping was performed prior to cutting. Cord gasses were not obtained. Cord blood was not collected. Pitocin in IV fluid was administered per protocol. The placenta delivered spontaneously intact with 3 vessel cord and revealed normal anatomy. Uterine massage was performed and the fundus was found to be firm. Vagina, cervix, and perineum were inspected for lacerations. 1st degree laceration was noted and repaired with 3-0 vicryl. All counts  were correct. Mom and baby stable in room.    Primary OB:  [facility] Amarilis Gonzales MD  12:11 PM  July 3, 2025

## 2025-07-03 NOTE — PROVIDER NOTIFICATION
07/03/25 0147   Provider Notification   Provider Name/Title Dr. stiles   Method of Notification Phone   Request Evaluate - Remote   Notification Reason Status Update;Decels     Dr. Stiles returned page. Updated on but not limited to fetal heart tracing, contraction pattern, and decelerations. Orders obtained to administer terbutaline and continue to monitor. TORB.

## 2025-07-03 NOTE — PLAN OF CARE
Goal Outcome Evaluation:          viable male infant.  Apgars 8/9.  Pt didn't need to push as contraction pushed baby out.  Dr. Gonzales present for delivery.

## 2025-07-03 NOTE — PLAN OF CARE
Problem: Labor  Goal: Hemostasis  Outcome: Progressing  Goal: Stable Fetal Wellbeing  Outcome: Progressing   Goal Outcome Evaluation:                      VSS stable during shift. Continue to monitor at current rate.

## 2025-07-03 NOTE — PLAN OF CARE
Goal Outcome Evaluation:             Pt called RN to room and stated that she felt rectal pressure with last contraction.  SVE by RN.  10cm/ +3.  Called to unit HUC to page MD to come for delivery and have inhouse MD come stand by.  Pt with history of quick, no push deliveries.

## 2025-07-03 NOTE — PROVIDER NOTIFICATION
07/03/25 0225   Provider Notification   Provider Name/Title Dr. stiles   Method of Notification Electronic Page   Request Evaluate - Remote   Notification Reason Status Update     Vocera page sent to Dr. Stiles as follows:     Just to update you: Terbutaline given at 0155. Last late decel I had was at 0200. Did have a variable at 0220. Contractions palpate mild. Performed SVE and no change from earlier. Contractions every 4-5 minutes. Patient definitely breathing through them. If you have any further orders or questions please call me at [phone]. Thanks.    Message received from  physician continue to monitor. No new orders.

## 2025-07-03 NOTE — PROVIDER NOTIFICATION
07/02/25 2100   Provider Notification   Provider Name/Title Dr. Stiles   Method of Notification Phone   Request Evaluate - Remote   Notification Reason Patient Arrived;Status Update     Spoke with Dr. Stiles regarding patient. Orders received to start vaginal cytotec at 0000 if blood pressures continue to remain under severe range. Ok for patient to walk in unit. If blood pressures remain under severe for 4 hours may change to every 4 hour vital signs. Do not need to recheck blood glucose until in active labor. TORB.

## 2025-07-03 NOTE — PROGRESS NOTES
S: Homero is having discomfort despite the epidural. Anesthesia paged and plan to redo epidural.  Still reports headache. She has had severe range pressures and received IV labetalol 20, 40, and 80.     O: Temp: 97.9  F (36.6  C) Temp src: Temporal BP: (!) 137/93     Resp: 14 SpO2: 100 % O2 Device: None (Room air)       AROM at 7:30am and recent RN recheck 4cm.   FHT baseline 130s, minimal to moderate variability, no accels, no decels  [place] regular q2-3 min  Pitocin: none    A/P: 38yo  @ 37wk admitted last evening with GHTN for induction of labor, now preeclampsia with severe features.     Pregnancy c/b Hx PreE in prior pregnancies, velamentous cord insert, GBS pos, Anemia, AMA, sickle cell trait, abnormal 1hr GTT did not complete 3hr     Preeclampsia with severe features  - severe range BPs, received IV labetalol 20mg, 40mg, and 80mg  - Will start magnesium for seizure prophylaxis  - Received procardia 30mg yesterday evening. Will add procardia 30mg this morning for BID treatment for now.    Induction of Labor  - Received one dose of cytotec with closed cervix at midnight, proceeded to have decelerations and given terbutaline and resuscitative measures which resolved the decels.   - SVE now /-1 per RN  - continue monitoring progress after epidural replaced      Abnl 1hr, no GTT  - Monitoring BS's, no tx needed yet. GDM orders in place for monitor/tx per protocol     GBS pos  - PCN infusing    Alesha Gonzales MD

## 2025-07-03 NOTE — PROGRESS NOTES
Arrived from L&D. Report from MERVAT Gallego. Fundus check with RN. Pt oriented to surroundings, symptoms to report, medications available, POC, intake and output, feeding schedule, folder contents, paperwork, and menu. All questions and concerns answered by this RN.

## 2025-07-03 NOTE — PLAN OF CARE
Goal Outcome Evaluation:      Plan of Care Reviewed With: patient    Overall Patient Progress: improvingOverall Patient Progress: improving         VSS except BP. Blood pressures ranging 135-151/87-95, FF, scant bleeding, voiding without difficulty, passing flatus, tolerating a regular diet, and up ad cornelio. Pain is controlled with tylenol-refuses ibuprofen. Rayna had a HA that was resolved with tylenol. Refuses vision changes or RUQ pain. +2 reflexes, no clonus. Mag infusing. Breastfeeding her baby. All questions and concerns answered by this RN.

## 2025-07-03 NOTE — PROGRESS NOTES
"Rayna is very upset that baby was taken to the nursery for medical observation. She stated that she is not about to be  from her baby and this is uncalled for. The nurses are very \"rough\" with her baby and she refuses to let leave her baby's side. She is upset that her baby's condition wasn't communicated well to her before 3 nurses came in the room to attend to baby. This RN apologized and stated we will do everything we can to keep them together, but also educated her on the importance of keeping baby stable so he can remain in her room.  "

## 2025-07-03 NOTE — PROGRESS NOTES
Labor Progress Note    S: Reports HA new overnight. Feeling some discomfort in pelvic area, is not brittany to characterize fully.    Hx of fast labors, reports not pushing, babies just slid out    O:   Vitals:    25 0600 25 0615 25 0630 25 0645   BP: 122/86 113/66 112/70 117/72   Resp:       Temp:       TempSrc:       SpO2: 100% 100% 100%    Weight:       Height:         /72   Temp 97  F (36.1  C) (Temporal)   Resp 16   Ht 1.829 m (6')   Wt 85.3 kg (188 lb 0.8 oz)   LMP 10/17/2024 (Exact Date)   SpO2 100%   BMI 25.50 kg/m      Gen: AOx3, NAD    SVE: 3+/70/-1 with contraction, moderate, mid. AROM clear fluid, copious    FHT: 130 min-mod variability periods, +accels no decels  [place]: not picking up consistently, q 2-5    Pitocin: none    Glucose: Last yesterday 86.     A/P: 38yo  @ 37wk admitted last evening with GHTN for induction of labor. Received one dose of cytotec with closed cervix at midnight, proceeded to have decelerations and given terbutaline and resuscitative measures which resolved the decels. Patient with epidural. Has spont progressed to 3cm. AROM performed for induction/augmentation.     Pregnancy c/b Hx PreE in prior pregnancies, velamentous cord insert, GBS pos, Anemia, AMA, sickle cell trait, abnormal 1hr GTT did not complete 3hr    -GHTN.  Mild range pressures to normal pressures, started procardia 30mg last evening.Nl Labs, PCR equivocal.  HA today, will give tylenol as may be from procardia.     -Induction of labor   S/P 1dose cytotec vaginal. AROM for augmentation with SVE 3cm. Monitor for progress. Hx fast labors with little to no pushing per patient report.   Reassuring fetal status.    -Abnl 1hr, no GTT  Monitoring BS's, no tx needed yet. GDM orders in place for monitor/tx per protocol    -GBS pos  PCN infusing    -Anesthesia  Epidural in place    Brigitte Max Masters, DO  July 3, 2025  7:35 AM

## 2025-07-03 NOTE — PLAN OF CARE
Data: Homero Landry transferred to [facility] via wheelchair at 1530. Baby transferred via parent's arms.  Action: Receiving unit notified of transfer: Yes. Patient and family notified of room change. Report given to Madelin SERNA RN at 1540. Belongings sent to receiving unit. Accompanied by Registered Nurse. Oriented patient to surroundings. Call light within reach. ID bands double-checked with receiving RN.  Response: Patient tolerated transfer and is stable.Goal Outcome Evaluation:

## 2025-07-04 VITALS
OXYGEN SATURATION: 100 % | HEART RATE: 86 BPM | BODY MASS INDEX: 24.07 KG/M2 | HEIGHT: 72 IN | DIASTOLIC BLOOD PRESSURE: 84 MMHG | WEIGHT: 177.69 LBS | SYSTOLIC BLOOD PRESSURE: 128 MMHG | RESPIRATION RATE: 16 BRPM | TEMPERATURE: 98 F

## 2025-07-04 PROBLEM — O14.10 PREECLAMPSIA, SEVERE: Status: ACTIVE | Noted: 2025-07-04

## 2025-07-04 LAB — BACTERIA UR CULT: NORMAL

## 2025-07-04 PROCEDURE — 250N000011 HC RX IP 250 OP 636: Performed by: STUDENT IN AN ORGANIZED HEALTH CARE EDUCATION/TRAINING PROGRAM

## 2025-07-04 PROCEDURE — 250N000013 HC RX MED GY IP 250 OP 250 PS 637: Performed by: STUDENT IN AN ORGANIZED HEALTH CARE EDUCATION/TRAINING PROGRAM

## 2025-07-04 RX ORDER — ACETAMINOPHEN 325 MG/1
650 TABLET ORAL EVERY 4 HOURS PRN
Qty: 60 TABLET | Refills: 0 | Status: SHIPPED | OUTPATIENT
Start: 2025-07-04

## 2025-07-04 RX ORDER — LABETALOL 200 MG/1
200 TABLET, FILM COATED ORAL EVERY 12 HOURS
Qty: 60 TABLET | Refills: 1 | Status: SHIPPED | OUTPATIENT
Start: 2025-07-04

## 2025-07-04 RX ORDER — AMOXICILLIN 250 MG
2 CAPSULE ORAL
Qty: 30 TABLET | Refills: 0 | Status: SHIPPED | OUTPATIENT
Start: 2025-07-04

## 2025-07-04 RX ORDER — IBUPROFEN 800 MG/1
800 TABLET, FILM COATED ORAL EVERY 6 HOURS PRN
Qty: 60 TABLET | Refills: 0 | Status: SHIPPED | OUTPATIENT
Start: 2025-07-04

## 2025-07-04 RX ADMIN — LABETALOL HYDROCHLORIDE 200 MG: 200 TABLET, FILM COATED ORAL at 07:54

## 2025-07-04 RX ADMIN — ACETAMINOPHEN 650 MG: 325 TABLET, FILM COATED ORAL at 03:28

## 2025-07-04 RX ADMIN — LABETALOL HYDROCHLORIDE 200 MG: 200 TABLET, FILM COATED ORAL at 19:33

## 2025-07-04 RX ADMIN — MAGNESIUM SULFATE HEPTAHYDRATE 2 G/HR: 40 INJECTION, SOLUTION INTRAVENOUS at 04:17

## 2025-07-04 RX ADMIN — IBUPROFEN 800 MG: 400 TABLET ORAL at 03:28

## 2025-07-04 ASSESSMENT — ACTIVITIES OF DAILY LIVING (ADL)
ADLS_ACUITY_SCORE: 24
ADLS_ACUITY_SCORE: 23
ADLS_ACUITY_SCORE: 24
ADLS_ACUITY_SCORE: 23
ADLS_ACUITY_SCORE: 24
ADLS_ACUITY_SCORE: 23
ADLS_ACUITY_SCORE: 24

## 2025-07-04 NOTE — PROGRESS NOTES
[facility]    Brief History:   Homero Landry is a 39 year old  PPD1 s/p uncomplicated . Pregnancy complicated by: preE w/ SF, velamentous cord insertion, GBS positive, AMA, and sickle cell trait.    Subjective   Doing well without concerns. She denies any pain. Denies nausea or vomiting and is tolerating a general diet. Minimal lochia. Voiding without issue and is passing flatus. Patient has been able to ambulate. Mild headache. Denies vision changes, RUQ pain, or SOB. Continues on magnesium sulfate infusion. Has hx of preE in previous pregnancies and states never been on BP medications. She desires discharge today.    Objective     Vitals:  Visit Vitals  BP (!) 141/90   Pulse 82   Temp 97.9  F (36.6  C) (Oral)   Resp 16        Physical Exam:   Gen: AOx3, NAD  Heart: Regular rate  Lungs: Normal effort and work of breathing  Abd: Soft, mild tenderness to palpation. Fundus firm and 2 below umbilicus.    Extremities: No lower extremity pain or erythema.     I&Os:    Intake/Output Summary (Last 24 hours) at 2025 0758  Last data filed at 2025 0654  Gross per 24 hour   Intake 3746 ml   Output 6949 ml   Net -3203 ml        Labs:       Hemoglobin   Date Value Ref Range Status   2025 9.3 (L) 11.7 - 15.7 g/dL Final   2025 8.4 (L) 11.7 - 15.7 g/dL Final       B POS  Rhogamis not indicated    Assessment/Plan:    Homero Landry is a 39 year old  who is PPD1 s/p  for preE w/ SF. She continues on magnesium sulfate infusion and only has a mild headache. Blood pressures well controlled on PO labetalol. I discussed I do not recommend discharge today and this would be against medical advice. I discussed I would recommend inpatient monitoring of blood pressure medication throughout the day today after stopping the magnesium to ensure blood pressures are well controlled as medication adjustments are often needed. I discussed risks of stroke, seizure, end organ  damage, and death. She understands these risks.     # Preeclampsia with severe features  - Mild headache only  - Blood pressures are normal to low mild range on 200 PO labetalol BID at this time (Declined Nifedipine)  - Urine output is adequate  - Last HELLP labs wnl except baseline anemia on admit  - No signs of magnesium toxicity, continue infusion at 2g/hr (end time 1145 today)     #Anemia  - Po Fe ordered    #PPD1  -Continue routine cares  -Ambulation in room and hallways encouraged.  -Reportable signs and symptoms dicussed with the patient.    Disposition: anticipate Tomorrow    Hailey Mena MD  July 4, 2025  7:58 AM

## 2025-07-04 NOTE — PLAN OF CARE
Goal Outcome Evaluation:      Plan of Care Reviewed With: patient, significant other    Overall Patient Progress: improvingOverall Patient Progress: improving     Vital signs stable, assessment WNL. Denies pain and declines paid medication. Magnesium discontinued at 1145; blood pressures remain stable and patient declines headache, visual changes, epigastric pain. Up in room independently with no complaints of dizziness, voiding without difficulty. Significant other at bedside and supportive. Bottling baby with human donor milk, RN encouraged patient to pump to assist in milk supply; patient declines this shift. Saline lock leaking at insertion site, patient declines reinsertion and OB aware. Encouraged to call RN with needs, call light within reach.

## 2025-07-04 NOTE — PLAN OF CARE
Goal Outcome Evaluation:      Plan of Care Reviewed With: patient      Pt taking tylenol and ibuprofen for headache, Bp's have been anywhere from 1teens/60's - 140's/90's, pt denies epigastric pain or vision changes, 2+ reflexes, no clonus, lung sounds clear, tolerating diet, voiding adequate amounts, IV infusing. Breastfeeding attempts and bottling with donor milk. Encouraged to call with any questions or concerns. Will continue to monitor.

## 2025-07-04 NOTE — PLAN OF CARE
"Pt refusing AM glucose check, states \"I need to get out of this place.\" Pt rolled over and went back to sleep.   "

## 2025-07-05 NOTE — PLAN OF CARE
Goal Outcome Evaluation:      Plan of Care Reviewed With: patient    Overall Patient Progress: improving    Vss, fundus firm with scant flow. Pt denies headache, visual disturbance or epigastric pain. Pt has normal reflexes and no clonus. Trace edema noted. Pt denies the need for pain medication. Offered assistance to pump but does not like our pump. Did get patient a spectra pump for home use. Significant other and bedside and supportive. Encouraged to call with questions/needs.

## 2025-07-05 NOTE — PLAN OF CARE
Dr. Mena updated at 1952 that pt is wanting to leave tonight. MD recommends pt stay one more day for Bp monitoring, and states if pt leaves, it will be AMA.    RN spoke with pt and pt request to leave AMA, paperwork signed at 2020.  Pt states she will be leaving once significant other returns to the hospital. Meds reviewed with pt and paper signed.     At 2055, RN walked into room, both pt and significant other were packed, infant in car seat and walking out of the pt room. Bands double check and RN walked pt, significant other and infant to the car.